# Patient Record
Sex: FEMALE | Race: WHITE | NOT HISPANIC OR LATINO | Employment: UNEMPLOYED | URBAN - METROPOLITAN AREA
[De-identification: names, ages, dates, MRNs, and addresses within clinical notes are randomized per-mention and may not be internally consistent; named-entity substitution may affect disease eponyms.]

---

## 2024-05-02 ENCOUNTER — HOSPITAL ENCOUNTER (INPATIENT)
Facility: HOSPITAL | Age: 64
LOS: 14 days | Discharge: HOME | DRG: 885 | End: 2024-05-16
Attending: STUDENT IN AN ORGANIZED HEALTH CARE EDUCATION/TRAINING PROGRAM | Admitting: PSYCHIATRY & NEUROLOGY

## 2024-05-02 ENCOUNTER — HOSPITAL ENCOUNTER (EMERGENCY)
Facility: HOSPITAL | Age: 64
End: 2024-05-02
Attending: EMERGENCY MEDICINE

## 2024-05-02 VITALS
RESPIRATION RATE: 18 BRPM | DIASTOLIC BLOOD PRESSURE: 74 MMHG | TEMPERATURE: 99.3 F | OXYGEN SATURATION: 98 % | WEIGHT: 135 LBS | BODY MASS INDEX: 21.19 KG/M2 | HEIGHT: 67 IN | HEART RATE: 76 BPM | SYSTOLIC BLOOD PRESSURE: 157 MMHG

## 2024-05-02 DIAGNOSIS — I10 HYPERTENSION: ICD-10-CM

## 2024-05-02 DIAGNOSIS — F43.20 EMOTIONAL CRISIS: ICD-10-CM

## 2024-05-02 DIAGNOSIS — E55.9 VITAMIN D DEFICIENCY: ICD-10-CM

## 2024-05-02 DIAGNOSIS — Z72.0 TOBACCO USE: ICD-10-CM

## 2024-05-02 DIAGNOSIS — F33.2 SEVERE EPISODE OF RECURRENT MAJOR DEPRESSIVE DISORDER, WITHOUT PSYCHOTIC FEATURES (HCC): Primary | ICD-10-CM

## 2024-05-02 DIAGNOSIS — E78.5 DYSLIPIDEMIA: ICD-10-CM

## 2024-05-02 DIAGNOSIS — F10.10 ALCOHOL ABUSE: ICD-10-CM

## 2024-05-02 DIAGNOSIS — F43.20 EMOTIONAL CRISIS: Primary | ICD-10-CM

## 2024-05-02 DIAGNOSIS — F10.90 ALCOHOL USE DISORDER: ICD-10-CM

## 2024-05-02 DIAGNOSIS — F32.9 MAJOR DEPRESSION: ICD-10-CM

## 2024-05-02 LAB
AMPHETAMINES SERPL QL SCN: NEGATIVE
BACTERIA UR QL AUTO: ABNORMAL /HPF
BARBITURATES UR QL: NEGATIVE
BASOPHILS # BLD AUTO: 0.06 THOUSANDS/ÂΜL (ref 0–0.1)
BASOPHILS NFR BLD AUTO: 2 % (ref 0–1)
BENZODIAZ UR QL: NEGATIVE
BILIRUB UR QL STRIP: NEGATIVE
CLARITY UR: CLEAR
COCAINE UR QL: NEGATIVE
COLOR UR: YELLOW
EOSINOPHIL # BLD AUTO: 0.18 THOUSAND/ÂΜL (ref 0–0.61)
EOSINOPHIL NFR BLD AUTO: 5 % (ref 0–6)
ERYTHROCYTE [DISTWIDTH] IN BLOOD BY AUTOMATED COUNT: 12.9 % (ref 11.6–15.1)
ETHANOL EXG-MCNC: 0 MG/DL
ETHANOL SERPL-MCNC: 309 MG/DL
FENTANYL UR QL SCN: NEGATIVE
GLUCOSE UR STRIP-MCNC: NEGATIVE MG/DL
HCT VFR BLD AUTO: 41 % (ref 34.8–46.1)
HGB BLD-MCNC: 13.6 G/DL (ref 11.5–15.4)
HGB UR QL STRIP.AUTO: ABNORMAL
HYDROCODONE UR QL SCN: NEGATIVE
IMM GRANULOCYTES # BLD AUTO: 0.02 THOUSAND/UL (ref 0–0.2)
IMM GRANULOCYTES NFR BLD AUTO: 1 % (ref 0–2)
KETONES UR STRIP-MCNC: ABNORMAL MG/DL
LEUKOCYTE ESTERASE UR QL STRIP: NEGATIVE
LYMPHOCYTES # BLD AUTO: 1.46 THOUSANDS/ÂΜL (ref 0.6–4.47)
LYMPHOCYTES NFR BLD AUTO: 36 % (ref 14–44)
MCH RBC QN AUTO: 33.2 PG (ref 26.8–34.3)
MCHC RBC AUTO-ENTMCNC: 33.2 G/DL (ref 31.4–37.4)
MCV RBC AUTO: 100 FL (ref 82–98)
METHADONE UR QL: NEGATIVE
MONOCYTES # BLD AUTO: 0.25 THOUSAND/ÂΜL (ref 0.17–1.22)
MONOCYTES NFR BLD AUTO: 6 % (ref 4–12)
MUCOUS THREADS UR QL AUTO: ABNORMAL
NEUTROPHILS # BLD AUTO: 2.07 THOUSANDS/ÂΜL (ref 1.85–7.62)
NEUTS SEG NFR BLD AUTO: 50 % (ref 43–75)
NITRITE UR QL STRIP: NEGATIVE
NON-SQ EPI CELLS URNS QL MICRO: ABNORMAL /HPF
NRBC BLD AUTO-RTO: 0 /100 WBCS
OPIATES UR QL SCN: NEGATIVE
OXYCODONE+OXYMORPHONE UR QL SCN: NEGATIVE
PCP UR QL: NEGATIVE
PH UR STRIP.AUTO: 5 [PH]
PLATELET # BLD AUTO: 261 THOUSANDS/UL (ref 149–390)
PMV BLD AUTO: 9.2 FL (ref 8.9–12.7)
PROT UR STRIP-MCNC: NEGATIVE MG/DL
RBC # BLD AUTO: 4.1 MILLION/UL (ref 3.81–5.12)
RBC #/AREA URNS AUTO: ABNORMAL /HPF
SP GR UR STRIP.AUTO: 1.02 (ref 1–1.03)
THC UR QL: NEGATIVE
TSH SERPL DL<=0.05 MIU/L-ACNC: 1.7 UIU/ML (ref 0.45–4.5)
UROBILINOGEN UR STRIP-ACNC: <2 MG/DL
WBC # BLD AUTO: 4.04 THOUSAND/UL (ref 4.31–10.16)
WBC #/AREA URNS AUTO: ABNORMAL /HPF

## 2024-05-02 PROCEDURE — 84443 ASSAY THYROID STIM HORMONE: CPT | Performed by: EMERGENCY MEDICINE

## 2024-05-02 PROCEDURE — 80053 COMPREHEN METABOLIC PANEL: CPT | Performed by: EMERGENCY MEDICINE

## 2024-05-02 PROCEDURE — 85025 COMPLETE CBC W/AUTO DIFF WBC: CPT | Performed by: EMERGENCY MEDICINE

## 2024-05-02 PROCEDURE — 36415 COLL VENOUS BLD VENIPUNCTURE: CPT | Performed by: EMERGENCY MEDICINE

## 2024-05-02 PROCEDURE — 83735 ASSAY OF MAGNESIUM: CPT | Performed by: STUDENT IN AN ORGANIZED HEALTH CARE EDUCATION/TRAINING PROGRAM

## 2024-05-02 PROCEDURE — 99285 EMERGENCY DEPT VISIT HI MDM: CPT

## 2024-05-02 PROCEDURE — 82077 ASSAY SPEC XCP UR&BREATH IA: CPT | Performed by: EMERGENCY MEDICINE

## 2024-05-02 PROCEDURE — 99285 EMERGENCY DEPT VISIT HI MDM: CPT | Performed by: EMERGENCY MEDICINE

## 2024-05-02 PROCEDURE — 81001 URINALYSIS AUTO W/SCOPE: CPT | Performed by: EMERGENCY MEDICINE

## 2024-05-02 PROCEDURE — 80307 DRUG TEST PRSMV CHEM ANLYZR: CPT | Performed by: EMERGENCY MEDICINE

## 2024-05-02 PROCEDURE — 82075 ASSAY OF BREATH ETHANOL: CPT | Performed by: EMERGENCY MEDICINE

## 2024-05-02 RX ORDER — ACETAMINOPHEN 325 MG/1
650 TABLET ORAL EVERY 4 HOURS PRN
Status: CANCELLED | OUTPATIENT
Start: 2024-05-02

## 2024-05-02 RX ORDER — TRAZODONE HYDROCHLORIDE 50 MG/1
50 TABLET ORAL
Status: ON HOLD | COMMUNITY

## 2024-05-02 RX ORDER — MAGNESIUM HYDROXIDE/ALUMINUM HYDROXICE/SIMETHICONE 120; 1200; 1200 MG/30ML; MG/30ML; MG/30ML
30 SUSPENSION ORAL EVERY 4 HOURS PRN
Status: DISCONTINUED | OUTPATIENT
Start: 2024-05-02 | End: 2024-05-16 | Stop reason: HOSPADM

## 2024-05-02 RX ORDER — TRAZODONE HYDROCHLORIDE 50 MG/1
50 TABLET ORAL
Status: DISCONTINUED | OUTPATIENT
Start: 2024-05-02 | End: 2024-05-06

## 2024-05-02 RX ORDER — BENZTROPINE MESYLATE 1 MG/ML
1 INJECTION INTRAMUSCULAR; INTRAVENOUS
Status: DISCONTINUED | OUTPATIENT
Start: 2024-05-02 | End: 2024-05-16 | Stop reason: HOSPADM

## 2024-05-02 RX ORDER — ACETAMINOPHEN 325 MG/1
975 TABLET ORAL EVERY 6 HOURS PRN
Status: DISCONTINUED | OUTPATIENT
Start: 2024-05-02 | End: 2024-05-16 | Stop reason: HOSPADM

## 2024-05-02 RX ORDER — MAGNESIUM HYDROXIDE/ALUMINUM HYDROXICE/SIMETHICONE 120; 1200; 1200 MG/30ML; MG/30ML; MG/30ML
30 SUSPENSION ORAL EVERY 4 HOURS PRN
Status: CANCELLED | OUTPATIENT
Start: 2024-05-02

## 2024-05-02 RX ORDER — CITALOPRAM 20 MG/1
20 TABLET ORAL DAILY
Status: ON HOLD | COMMUNITY

## 2024-05-02 RX ORDER — LORAZEPAM 2 MG/ML
2 INJECTION INTRAMUSCULAR
Status: DISCONTINUED | OUTPATIENT
Start: 2024-05-02 | End: 2024-05-16 | Stop reason: HOSPADM

## 2024-05-02 RX ORDER — HYDROXYZINE 50 MG/1
50 TABLET, FILM COATED ORAL
Status: DISCONTINUED | OUTPATIENT
Start: 2024-05-02 | End: 2024-05-16 | Stop reason: HOSPADM

## 2024-05-02 RX ORDER — BISACODYL 10 MG
10 SUPPOSITORY, RECTAL RECTAL DAILY PRN
Status: DISCONTINUED | OUTPATIENT
Start: 2024-05-02 | End: 2024-05-16 | Stop reason: HOSPADM

## 2024-05-02 RX ORDER — LORAZEPAM 2 MG/ML
1 INJECTION INTRAMUSCULAR
Status: CANCELLED | OUTPATIENT
Start: 2024-05-02

## 2024-05-02 RX ORDER — ACETAMINOPHEN 325 MG/1
650 TABLET ORAL EVERY 4 HOURS PRN
Status: DISCONTINUED | OUTPATIENT
Start: 2024-05-02 | End: 2024-05-16 | Stop reason: HOSPADM

## 2024-05-02 RX ORDER — DIPHENHYDRAMINE HYDROCHLORIDE 50 MG/ML
50 INJECTION INTRAMUSCULAR; INTRAVENOUS EVERY 6 HOURS PRN
Status: CANCELLED | OUTPATIENT
Start: 2024-05-02

## 2024-05-02 RX ORDER — HYDROXYZINE HYDROCHLORIDE 25 MG/1
25 TABLET, FILM COATED ORAL
Status: CANCELLED | OUTPATIENT
Start: 2024-05-02

## 2024-05-02 RX ORDER — BENZTROPINE MESYLATE 1 MG/ML
1 INJECTION INTRAMUSCULAR; INTRAVENOUS
Status: CANCELLED | OUTPATIENT
Start: 2024-05-02

## 2024-05-02 RX ORDER — TRAZODONE HYDROCHLORIDE 50 MG/1
50 TABLET ORAL
Status: CANCELLED | OUTPATIENT
Start: 2024-05-02

## 2024-05-02 RX ORDER — HALOPERIDOL 5 MG/1
5 TABLET ORAL
Status: DISCONTINUED | OUTPATIENT
Start: 2024-05-02 | End: 2024-05-16 | Stop reason: HOSPADM

## 2024-05-02 RX ORDER — HALOPERIDOL 5 MG/1
2.5 TABLET ORAL
Status: DISCONTINUED | OUTPATIENT
Start: 2024-05-02 | End: 2024-05-16 | Stop reason: HOSPADM

## 2024-05-02 RX ORDER — HALOPERIDOL 1 MG/1
1 TABLET ORAL EVERY 6 HOURS PRN
Status: CANCELLED | OUTPATIENT
Start: 2024-05-02

## 2024-05-02 RX ORDER — POLYETHYLENE GLYCOL 3350 17 G/17G
17 POWDER, FOR SOLUTION ORAL DAILY PRN
Status: DISCONTINUED | OUTPATIENT
Start: 2024-05-02 | End: 2024-05-16 | Stop reason: HOSPADM

## 2024-05-02 RX ORDER — LANOLIN ALCOHOL/MO/W.PET/CERES
3 CREAM (GRAM) TOPICAL
Status: CANCELLED | OUTPATIENT
Start: 2024-05-02

## 2024-05-02 RX ORDER — HYDROXYZINE 50 MG/1
100 TABLET, FILM COATED ORAL
Status: DISCONTINUED | OUTPATIENT
Start: 2024-05-02 | End: 2024-05-16 | Stop reason: HOSPADM

## 2024-05-02 RX ORDER — BENZTROPINE MESYLATE 1 MG/1
1 TABLET ORAL
Status: DISCONTINUED | OUTPATIENT
Start: 2024-05-02 | End: 2024-05-16 | Stop reason: HOSPADM

## 2024-05-02 RX ORDER — HALOPERIDOL 5 MG/ML
5 INJECTION INTRAMUSCULAR
Status: DISCONTINUED | OUTPATIENT
Start: 2024-05-02 | End: 2024-05-16 | Stop reason: HOSPADM

## 2024-05-02 RX ORDER — LORAZEPAM 2 MG/ML
2 INJECTION INTRAMUSCULAR EVERY 6 HOURS PRN
Status: DISCONTINUED | OUTPATIENT
Start: 2024-05-02 | End: 2024-05-16 | Stop reason: HOSPADM

## 2024-05-02 RX ORDER — HALOPERIDOL 5 MG/1
2.5 TABLET ORAL
Status: CANCELLED | OUTPATIENT
Start: 2024-05-02

## 2024-05-02 RX ORDER — AMOXICILLIN 250 MG
1 CAPSULE ORAL DAILY PRN
Status: DISCONTINUED | OUTPATIENT
Start: 2024-05-02 | End: 2024-05-16 | Stop reason: HOSPADM

## 2024-05-02 RX ORDER — ACETAMINOPHEN 325 MG/1
650 TABLET ORAL EVERY 6 HOURS PRN
Status: CANCELLED | OUTPATIENT
Start: 2024-05-02

## 2024-05-02 RX ORDER — HALOPERIDOL 5 MG/1
5 TABLET ORAL
Status: CANCELLED | OUTPATIENT
Start: 2024-05-02

## 2024-05-02 RX ORDER — LORAZEPAM 2 MG/ML
2 INJECTION INTRAMUSCULAR EVERY 6 HOURS PRN
Status: CANCELLED | OUTPATIENT
Start: 2024-05-02

## 2024-05-02 RX ORDER — HALOPERIDOL 5 MG/ML
5 INJECTION INTRAMUSCULAR
Status: CANCELLED | OUTPATIENT
Start: 2024-05-02

## 2024-05-02 RX ORDER — ACETAMINOPHEN 325 MG/1
975 TABLET ORAL EVERY 6 HOURS PRN
Status: CANCELLED | OUTPATIENT
Start: 2024-05-02

## 2024-05-02 RX ORDER — DIPHENHYDRAMINE HYDROCHLORIDE 50 MG/ML
50 INJECTION INTRAMUSCULAR; INTRAVENOUS EVERY 6 HOURS PRN
Status: DISCONTINUED | OUTPATIENT
Start: 2024-05-02 | End: 2024-05-16 | Stop reason: HOSPADM

## 2024-05-02 RX ORDER — LANOLIN ALCOHOL/MO/W.PET/CERES
3 CREAM (GRAM) TOPICAL
Status: DISCONTINUED | OUTPATIENT
Start: 2024-05-02 | End: 2024-05-10

## 2024-05-02 RX ORDER — HYDROXYZINE HYDROCHLORIDE 25 MG/1
25 TABLET, FILM COATED ORAL
Status: DISCONTINUED | OUTPATIENT
Start: 2024-05-02 | End: 2024-05-16 | Stop reason: HOSPADM

## 2024-05-02 RX ORDER — BENZTROPINE MESYLATE 0.5 MG/1
1 TABLET ORAL
Status: CANCELLED | OUTPATIENT
Start: 2024-05-02

## 2024-05-02 RX ORDER — POLYETHYLENE GLYCOL 3350 17 G/17G
17 POWDER, FOR SOLUTION ORAL DAILY PRN
Status: CANCELLED | OUTPATIENT
Start: 2024-05-02

## 2024-05-02 RX ORDER — HYDROXYZINE HYDROCHLORIDE 25 MG/1
50 TABLET, FILM COATED ORAL
Status: CANCELLED | OUTPATIENT
Start: 2024-05-02

## 2024-05-02 RX ORDER — ACETAMINOPHEN 325 MG/1
650 TABLET ORAL EVERY 6 HOURS PRN
Status: DISCONTINUED | OUTPATIENT
Start: 2024-05-02 | End: 2024-05-16 | Stop reason: HOSPADM

## 2024-05-02 RX ORDER — HALOPERIDOL 5 MG/ML
2.5 INJECTION INTRAMUSCULAR
Status: CANCELLED | OUTPATIENT
Start: 2024-05-02

## 2024-05-02 RX ORDER — BENZTROPINE MESYLATE 1 MG/ML
0.5 INJECTION INTRAMUSCULAR; INTRAVENOUS
Status: DISCONTINUED | OUTPATIENT
Start: 2024-05-02 | End: 2024-05-16 | Stop reason: HOSPADM

## 2024-05-02 RX ORDER — HYDROXYZINE HYDROCHLORIDE 25 MG/1
100 TABLET, FILM COATED ORAL
Status: CANCELLED | OUTPATIENT
Start: 2024-05-02

## 2024-05-02 RX ORDER — AMOXICILLIN 250 MG
1 CAPSULE ORAL DAILY PRN
Status: CANCELLED | OUTPATIENT
Start: 2024-05-02

## 2024-05-02 RX ORDER — BISACODYL 10 MG
10 SUPPOSITORY, RECTAL RECTAL DAILY PRN
Status: CANCELLED | OUTPATIENT
Start: 2024-05-02

## 2024-05-02 RX ORDER — ESCITALOPRAM OXALATE 20 MG/1
20 TABLET ORAL DAILY
COMMUNITY
End: 2024-05-16

## 2024-05-02 RX ORDER — BENZTROPINE MESYLATE 1 MG/ML
0.5 INJECTION INTRAMUSCULAR; INTRAVENOUS
Status: CANCELLED | OUTPATIENT
Start: 2024-05-02

## 2024-05-02 RX ORDER — LORAZEPAM 2 MG/ML
1 INJECTION INTRAMUSCULAR
Status: DISCONTINUED | OUTPATIENT
Start: 2024-05-02 | End: 2024-05-16 | Stop reason: HOSPADM

## 2024-05-02 RX ORDER — BISACODYL 10 MG
10 SUPPOSITORY, RECTAL RECTAL DAILY PRN
Status: DISCONTINUED | OUTPATIENT
Start: 2024-05-02 | End: 2024-05-02

## 2024-05-02 RX ORDER — HALOPERIDOL 1 MG/1
1 TABLET ORAL EVERY 6 HOURS PRN
Status: DISCONTINUED | OUTPATIENT
Start: 2024-05-02 | End: 2024-05-16 | Stop reason: HOSPADM

## 2024-05-02 RX ORDER — HALOPERIDOL 5 MG/ML
2.5 INJECTION INTRAMUSCULAR
Status: DISCONTINUED | OUTPATIENT
Start: 2024-05-02 | End: 2024-05-16 | Stop reason: HOSPADM

## 2024-05-02 RX ORDER — LORAZEPAM 2 MG/ML
2 INJECTION INTRAMUSCULAR
Status: CANCELLED | OUTPATIENT
Start: 2024-05-02

## 2024-05-02 RX ADMIN — NICOTINE POLACRILEX 2 MG: 2 GUM, CHEWING BUCCAL at 18:01

## 2024-05-02 RX ADMIN — MELATONIN TAB 3 MG 3 MG: 3 TAB at 22:21

## 2024-05-02 RX ADMIN — TRAZODONE HYDROCHLORIDE 50 MG: 50 TABLET ORAL at 22:21

## 2024-05-02 NOTE — ED NOTES
5/2/24 @ 0930:  ROYCE notes that patient's BAL was 309 @ 0830; medical clearance should be around 1630, at which point, patient will be assessed.  Wendy MS

## 2024-05-02 NOTE — ED NOTES
"Please see separate note by Anjel Harp.    64 yo IRLANDA presented to ER this morning via police with a BAL of 309 @ 08:30 due to: \"Because I threatened to commit suicide to my sister - plan was to leave the house - drink myself to oblivion and to not wake up or then take pills\" - also wrote a suicide note.  The patient is not known to PES.  There were no identified stressors by the patient's report.  Mood = initially stated \"been OK\" - later stated - \"I have depression\".  Symptoms include: lethality concerns and probably minimizing her Etoh use; poor sleep of 5-6 hours; poor self esteem;.  The patient denies: HI; psychosis; paranoia; drug use; any change with concentration/energy level.  Reports a past hx of \"anxiety and panic d/o\" - but not having symptoms now.    PES briefly spoke to patient's sister, Darnell @ [phone] and then had sister speak with patient.  "

## 2024-05-02 NOTE — ED NOTES
Pt given lunch tray and reminded we require urine sample from her     Derek Johanson, RONEL  05/02/24 6918

## 2024-05-02 NOTE — ED PROVIDER NOTES
History  Chief Complaint   Patient presents with    Psychiatric Evaluation     Pt per police admits being suicidal, her plan is to take pills and drink alcohol to kill herself     Patient brought in by police for evaluation of suicidal ideations.  Family called police after the found out was her intention to take pills and go outside and die.  Patient has a history of previous attempts.  Patient states she was stopped before she was able to attempt this time.  Reports ongoing depression no recent inciting events.  Admits to drinking alcohol today but denies any other drug use.  She was on SSRI but has not had it for the last 3 days.      History provided by:  Police and patient   used: No    Psychiatric Evaluation  Presenting symptoms: suicidal thoughts        None       No past medical history on file.    No past surgical history on file.    No family history on file.  I have reviewed and agree with the history as documented.    No existing history information found.  No existing history information found.       Review of Systems   Psychiatric/Behavioral:  Positive for suicidal ideas.    All other systems reviewed and are negative.      Physical Exam  Physical Exam  Vitals and nursing note reviewed.   Constitutional:       General: She is not in acute distress.  Cardiovascular:      Rate and Rhythm: Normal rate and regular rhythm.   Pulmonary:      Effort: Pulmonary effort is normal. No respiratory distress.      Breath sounds: Normal breath sounds.   Neurological:      General: No focal deficit present.      Mental Status: She is alert and oriented to person, place, and time.         Vital Signs  ED Triage Vitals [05/02/24 0808]   Temperature Pulse Respirations Blood Pressure SpO2   98.5 °F (36.9 °C) 85 18 168/92 98 %      Temp src Heart Rate Source Patient Position - Orthostatic VS BP Location FiO2 (%)   -- Monitor -- Right arm --      Pain Score       --           Vitals:    05/02/24 0808    BP: 168/92   Pulse: 85         Visual Acuity      ED Medications  Medications - No data to display    Diagnostic Studies  Results Reviewed       Procedure Component Value Units Date/Time    CBC and differential [[id number]]  (Abnormal) Collected: 05/02/24 0830    Lab Status: Final result Specimen: Blood from Hand, Right Updated: 05/02/24 0836     WBC 4.04 Thousand/uL      RBC 4.10 Million/uL      Hemoglobin 13.6 g/dL      Hematocrit 41.0 %       fL      MCH 33.2 pg      MCHC 33.2 g/dL      RDW 12.9 %      MPV 9.2 fL      Platelets 261 Thousands/uL      nRBC 0 /100 WBCs      Segmented % 50 %      Immature Grans % 1 %      Lymphocytes % 36 %      Monocytes % 6 %      Eosinophils Relative 5 %      Basophils Relative 2 %      Absolute Neutrophils 2.07 Thousands/µL      Absolute Immature Grans 0.02 Thousand/uL      Absolute Lymphocytes 1.46 Thousands/µL      Absolute Monocytes 0.25 Thousand/µL      Eosinophils Absolute 0.18 Thousand/µL      Basophils Absolute 0.06 Thousands/µL     Ethanol [[id number]] Collected: 05/02/24 0830    Lab Status: In process Specimen: Blood from Arm, Right Updated: 05/02/24 0833    Comprehensive metabolic panel [[id number]] Collected: 05/02/24 0830    Lab Status: In process Specimen: Blood from Hand, Right Updated: 05/02/24 0833    UA (URINE) with reflex to Scope [[id number]]     Lab Status: No result Specimen: Urine     Rapid drug screen, urine [[id number]]     Lab Status: No result Specimen: Urine                    No orders to display              Procedures  Procedures         ED Course                               SBIRT 20yo+      Flowsheet Row Most Recent Value   Initial Alcohol Screen: US AUDIT-C     1. How often do you have a drink containing alcohol? 6 Filed at: 05/02/2024 0839   2. How many drinks containing alcohol do you have on a typical day you are drinking?  6 Filed at: 05/02/2024 0839   3b. FEMALE Any Age, or MALE 65+: How often do you have 4 or more drinks on one  occassion? 6 Filed at: 05/02/2024 0839   Audit-C Score 18 Filed at: 05/02/2024 0839   Full Alcohol Screen: US AUDIT    4. How often during the last year have you found that you were not able to stop drinking once you had started? 4 Filed at: 05/02/2024 0839   5. How often during past year have you failed to do what was normally expected of you because of drinking?  4 Filed at: 05/02/2024 0839   6. How often in past year have you needed a first drink in the morning to get yourself going after a heavy drinking session?  4 Filed at: 05/02/2024 0839   7. How often in past year have you had feeling of guilt or remorse after drinking?  4 Filed at: 05/02/2024 0839   8. How often in past year have you been unable to remember what happened night before because you had been drinking?  3 Filed at: 05/02/2024 0839   9. Have you or someone else been injured as a result of your drinking?  0 Filed at: 05/02/2024 0839   10. Has a relative, friend, doctor or other health worker been concerned about your drinking and suggested you cut down?  4 Filed at: 05/02/2024 0839   AUDIT Total Score 41 Filed at: 05/02/2024 0839   SUZANNE: How many times in the past year have you...    Used an illegal drug or used a prescription medication for non-medical reasons? Never Filed at: 05/02/2024 0839                      Medical Decision Making  Pulse ox 98% on room air indicating adequate oxygenation.    Patient medically cleared for mental health evaluation patient treatment as needed.      Signed out to next provider Dr. Sanders pending crisis evaluation.    Amount and/or Complexity of Data Reviewed  Labs: ordered.    Risk  Decision regarding hospitalization.             Disposition  Final diagnoses:   None     ED Disposition       ED Disposition   Transfer to Behavioral Health Condition   --    Date/Time   Thu May 2, 2024 0821    Comment   Christy Borjas should be transferred out to [facility] and has been medically cleared.               Follow-up  Information    None         Patient's Medications    No medications on file       No discharge procedures on file.    PDMP Review       None            ED Provider  Electronically Signed by             Arnav House DO  05/03/24 9904

## 2024-05-02 NOTE — ED NOTES
Received pt from previous shift. Pt quietly resting on bed watching tv at this time. Respirations even and non-labored; no distress noted at this time. Pt offers no c/o at this time. 1:1 continual observation remains at this time.     Amarilis Almazan RN  05/02/24 1912

## 2024-05-02 NOTE — ED NOTES
"5/2/24 @ 1230:  Patient's sister, who is also patient's emergency contact came to visit with patient.  PES informed that she wasn't able to have visitors at this point.  Sister provided a suicide note from the patient and reporrts that patient has at least 3 other attempts which resulted in hospitalizations.  Sister says that patient has a long history of alcoholism and moved in with her sister in Oct/Nov of 2023, and was living in [place] prior.  In addition, sister reports a very strong family history of substance abuse and mental illness.  Sister provided a prescription bottle of Trazodone from 2023 which was given to RN to sent to Pharmacy.  PES informed sister that patient wouldn't be \"medically cleared\" until 1630.  Please outlined the process and if patient isn't voluntary, the sister would like to talk to her.  PES states that would be up to the patient, and sister understands.  PES will continue to monitor.  MS Wendy  "

## 2024-05-02 NOTE — ED NOTES
17:30 - spoke with [facility] - beds available - patient signed 201 - faxed to [facility] with suicide note.    18:30 - [facility] requested TSH and repeat Etoh - ER staff advised.    19:20 - [facility] texted all labs now resulted.    Patient is accepted at  6B.  Patient is accepted by Dr.Qureshi kenny Siddiqui    Transportation is arranged with [facility]. @ 20:35 -     Transportation is scheduled for 21:30 via [facility] - arrived 21:15  Patient may go to the floor after 21:30    Nurse report is to be called to [phone] prior to patient transfer.    Patient has no insurance coverage.       Patient's sister Darnell informed of transport time and phone # to unit.

## 2024-05-03 PROBLEM — E78.5 DYSLIPIDEMIA: Status: ACTIVE | Noted: 2024-05-03

## 2024-05-03 PROBLEM — F33.2 SEVERE EPISODE OF RECURRENT MAJOR DEPRESSIVE DISORDER, WITHOUT PSYCHOTIC FEATURES (HCC): Status: ACTIVE | Noted: 2024-05-03

## 2024-05-03 PROBLEM — Z72.0 TOBACCO USE: Status: ACTIVE | Noted: 2024-05-03

## 2024-05-03 PROBLEM — I10 HYPERTENSION: Status: ACTIVE | Noted: 2024-05-03

## 2024-05-03 PROBLEM — F10.90 ALCOHOL USE DISORDER: Status: ACTIVE | Noted: 2024-05-03

## 2024-05-03 PROBLEM — Z00.8 MEDICAL CLEARANCE FOR PSYCHIATRIC ADMISSION: Status: ACTIVE | Noted: 2024-05-03

## 2024-05-03 LAB
25(OH)D3 SERPL-MCNC: 9.3 NG/ML (ref 30–100)
ALBUMIN SERPL BCP-MCNC: 3.8 G/DL (ref 3.5–5)
ALP SERPL-CCNC: 46 U/L (ref 34–104)
ALT SERPL W P-5'-P-CCNC: 8 U/L (ref 7–52)
ANION GAP SERPL CALCULATED.3IONS-SCNC: 12 MMOL/L (ref 4–13)
APTT PPP: 30 SECONDS (ref 23–37)
AST SERPL W P-5'-P-CCNC: 20 U/L (ref 13–39)
ATRIAL RATE: 59 BPM
BACTERIA UR QL AUTO: ABNORMAL /HPF
BASOPHILS # BLD AUTO: 0.07 THOUSANDS/ÂΜL (ref 0–0.1)
BASOPHILS NFR BLD AUTO: 1 % (ref 0–1)
BILIRUB SERPL-MCNC: 0.71 MG/DL (ref 0.2–1)
BILIRUB UR QL STRIP: NEGATIVE
BUN SERPL-MCNC: 15 MG/DL (ref 5–25)
CALCIUM SERPL-MCNC: 9 MG/DL (ref 8.4–10.2)
CHLORIDE SERPL-SCNC: 99 MMOL/L (ref 96–108)
CHOLEST SERPL-MCNC: 247 MG/DL
CLARITY UR: CLEAR
CO2 SERPL-SCNC: 25 MMOL/L (ref 21–32)
COLOR UR: ABNORMAL
CREAT SERPL-MCNC: 0.41 MG/DL (ref 0.6–1.3)
EOSINOPHIL # BLD AUTO: 0.26 THOUSAND/ÂΜL (ref 0–0.61)
EOSINOPHIL NFR BLD AUTO: 5 % (ref 0–6)
ERYTHROCYTE [DISTWIDTH] IN BLOOD BY AUTOMATED COUNT: 12.5 % (ref 11.6–15.1)
FIBRINOGEN PPP-MCNC: 404 MG/DL (ref 207–520)
FOLATE SERPL-MCNC: 5.9 NG/ML
GFR SERPL CREATININE-BSD FRML MDRD: 111 ML/MIN/1.73SQ M
GLUCOSE P FAST SERPL-MCNC: 75 MG/DL (ref 65–99)
GLUCOSE SERPL-MCNC: 75 MG/DL (ref 65–140)
GLUCOSE UR STRIP-MCNC: NEGATIVE MG/DL
HCT VFR BLD AUTO: 40.6 % (ref 34.8–46.1)
HDLC SERPL-MCNC: 72 MG/DL
HGB BLD-MCNC: 12.6 G/DL (ref 11.5–15.4)
HGB UR QL STRIP.AUTO: NEGATIVE
IMM GRANULOCYTES # BLD AUTO: 0 THOUSAND/UL (ref 0–0.2)
IMM GRANULOCYTES NFR BLD AUTO: 0 % (ref 0–2)
INR PPP: 0.91 (ref 0.84–1.19)
KETONES UR STRIP-MCNC: ABNORMAL MG/DL
LDLC SERPL CALC-MCNC: 149 MG/DL (ref 0–100)
LEUKOCYTE ESTERASE UR QL STRIP: 25
LYMPHOCYTES # BLD AUTO: 1.51 THOUSANDS/ÂΜL (ref 0.6–4.47)
LYMPHOCYTES NFR BLD AUTO: 31 % (ref 14–44)
MAGNESIUM SERPL-MCNC: 2.1 MG/DL (ref 1.9–2.7)
MCH RBC QN AUTO: 32.6 PG (ref 26.8–34.3)
MCHC RBC AUTO-ENTMCNC: 31 G/DL (ref 31.4–37.4)
MCV RBC AUTO: 105 FL (ref 82–98)
MONOCYTES # BLD AUTO: 0.64 THOUSAND/ÂΜL (ref 0.17–1.22)
MONOCYTES NFR BLD AUTO: 13 % (ref 4–12)
MUCOUS THREADS UR QL AUTO: ABNORMAL
NEUTROPHILS # BLD AUTO: 2.45 THOUSANDS/ÂΜL (ref 1.85–7.62)
NEUTS SEG NFR BLD AUTO: 50 % (ref 43–75)
NITRITE UR QL STRIP: NEGATIVE
NON-SQ EPI CELLS URNS QL MICRO: ABNORMAL /HPF
NONHDLC SERPL-MCNC: 175 MG/DL
NRBC BLD AUTO-RTO: 0 /100 WBCS
P AXIS: 33 DEGREES
PH UR STRIP.AUTO: 6.5 [PH]
PLATELET # BLD AUTO: 218 THOUSANDS/UL (ref 149–390)
PLATELET # BLD AUTO: 218 THOUSANDS/UL (ref 149–390)
PMV BLD AUTO: 9.7 FL (ref 8.9–12.7)
PMV BLD AUTO: 9.8 FL (ref 8.9–12.7)
POTASSIUM SERPL-SCNC: 3.7 MMOL/L (ref 3.5–5.3)
PR INTERVAL: 144 MS
PROT SERPL-MCNC: 6.6 G/DL (ref 6.4–8.4)
PROT UR STRIP-MCNC: ABNORMAL MG/DL
PROTHROMBIN TIME: 12.6 SECONDS (ref 11.6–14.5)
QRS AXIS: -3 DEGREES
QRSD INTERVAL: 90 MS
QT INTERVAL: 480 MS
QTC INTERVAL: 475 MS
RBC # BLD AUTO: 3.86 MILLION/UL (ref 3.81–5.12)
RBC #/AREA URNS AUTO: ABNORMAL /HPF
SODIUM SERPL-SCNC: 136 MMOL/L (ref 135–147)
SP GR UR STRIP.AUTO: 1.01 (ref 1–1.04)
T WAVE AXIS: 38 DEGREES
THROMBIN TIME: 16.8 SECONDS (ref 14.7–18.4)
TRIGL SERPL-MCNC: 132 MG/DL
TSH SERPL DL<=0.05 MIU/L-ACNC: 2.58 UIU/ML (ref 0.45–4.5)
UROBILINOGEN UA: NEGATIVE MG/DL
VENTRICULAR RATE: 59 BPM
VIT B12 SERPL-MCNC: 147 PG/ML (ref 180–914)
WBC # BLD AUTO: 4.93 THOUSAND/UL (ref 4.31–10.16)
WBC #/AREA URNS AUTO: ABNORMAL /HPF

## 2024-05-03 PROCEDURE — 85049 AUTOMATED PLATELET COUNT: CPT | Performed by: STUDENT IN AN ORGANIZED HEALTH CARE EDUCATION/TRAINING PROGRAM

## 2024-05-03 PROCEDURE — 93010 ELECTROCARDIOGRAM REPORT: CPT | Performed by: STUDENT IN AN ORGANIZED HEALTH CARE EDUCATION/TRAINING PROGRAM

## 2024-05-03 PROCEDURE — 82746 ASSAY OF FOLIC ACID SERUM: CPT | Performed by: PSYCHIATRY & NEUROLOGY

## 2024-05-03 PROCEDURE — 80061 LIPID PANEL: CPT | Performed by: PSYCHIATRY & NEUROLOGY

## 2024-05-03 PROCEDURE — 85025 COMPLETE CBC W/AUTO DIFF WBC: CPT | Performed by: PSYCHIATRY & NEUROLOGY

## 2024-05-03 PROCEDURE — 80053 COMPREHEN METABOLIC PANEL: CPT | Performed by: PSYCHIATRY & NEUROLOGY

## 2024-05-03 PROCEDURE — 85384 FIBRINOGEN ACTIVITY: CPT | Performed by: STUDENT IN AN ORGANIZED HEALTH CARE EDUCATION/TRAINING PROGRAM

## 2024-05-03 PROCEDURE — 85730 THROMBOPLASTIN TIME PARTIAL: CPT | Performed by: STUDENT IN AN ORGANIZED HEALTH CARE EDUCATION/TRAINING PROGRAM

## 2024-05-03 PROCEDURE — 85670 THROMBIN TIME PLASMA: CPT | Performed by: STUDENT IN AN ORGANIZED HEALTH CARE EDUCATION/TRAINING PROGRAM

## 2024-05-03 PROCEDURE — 82306 VITAMIN D 25 HYDROXY: CPT | Performed by: PSYCHIATRY & NEUROLOGY

## 2024-05-03 PROCEDURE — 99223 1ST HOSP IP/OBS HIGH 75: CPT | Performed by: STUDENT IN AN ORGANIZED HEALTH CARE EDUCATION/TRAINING PROGRAM

## 2024-05-03 PROCEDURE — 84443 ASSAY THYROID STIM HORMONE: CPT | Performed by: PSYCHIATRY & NEUROLOGY

## 2024-05-03 PROCEDURE — 85610 PROTHROMBIN TIME: CPT | Performed by: STUDENT IN AN ORGANIZED HEALTH CARE EDUCATION/TRAINING PROGRAM

## 2024-05-03 PROCEDURE — 81001 URINALYSIS AUTO W/SCOPE: CPT | Performed by: PSYCHIATRY & NEUROLOGY

## 2024-05-03 PROCEDURE — 93005 ELECTROCARDIOGRAM TRACING: CPT

## 2024-05-03 PROCEDURE — 99253 IP/OBS CNSLTJ NEW/EST LOW 45: CPT | Performed by: NURSE PRACTITIONER

## 2024-05-03 PROCEDURE — 82607 VITAMIN B-12: CPT | Performed by: PSYCHIATRY & NEUROLOGY

## 2024-05-03 RX ORDER — ESCITALOPRAM OXALATE 10 MG/1
20 TABLET ORAL DAILY
Status: DISCONTINUED | OUTPATIENT
Start: 2024-05-03 | End: 2024-05-16 | Stop reason: HOSPADM

## 2024-05-03 RX ORDER — LORAZEPAM 1 MG/1
1 TABLET ORAL EVERY 8 HOURS PRN
Status: DISCONTINUED | OUTPATIENT
Start: 2024-05-03 | End: 2024-05-06

## 2024-05-03 RX ORDER — ERGOCALCIFEROL 1.25 MG/1
50000 CAPSULE ORAL WEEKLY
Status: DISCONTINUED | OUTPATIENT
Start: 2024-05-03 | End: 2024-05-16 | Stop reason: HOSPADM

## 2024-05-03 RX ORDER — ATORVASTATIN CALCIUM 10 MG/1
10 TABLET, FILM COATED ORAL
Status: DISCONTINUED | OUTPATIENT
Start: 2024-05-03 | End: 2024-05-16 | Stop reason: HOSPADM

## 2024-05-03 RX ORDER — AMLODIPINE BESYLATE 5 MG/1
5 TABLET ORAL DAILY
Status: DISCONTINUED | OUTPATIENT
Start: 2024-05-03 | End: 2024-05-16 | Stop reason: HOSPADM

## 2024-05-03 RX ORDER — NALTREXONE HYDROCHLORIDE 50 MG/1
25 TABLET, FILM COATED ORAL DAILY
Status: DISCONTINUED | OUTPATIENT
Start: 2024-05-03 | End: 2024-05-04

## 2024-05-03 RX ORDER — LANOLIN ALCOHOL/MO/W.PET/CERES
100 CREAM (GRAM) TOPICAL DAILY
Status: DISCONTINUED | OUTPATIENT
Start: 2024-05-03 | End: 2024-05-16 | Stop reason: HOSPADM

## 2024-05-03 RX ORDER — NALTREXONE HYDROCHLORIDE 50 MG/1
50 TABLET, FILM COATED ORAL DAILY
Status: DISCONTINUED | OUTPATIENT
Start: 2024-05-04 | End: 2024-05-16 | Stop reason: HOSPADM

## 2024-05-03 RX ORDER — NICOTINE 21 MG/24HR
14 PATCH, TRANSDERMAL 24 HOURS TRANSDERMAL DAILY
Status: DISCONTINUED | OUTPATIENT
Start: 2024-05-03 | End: 2024-05-16 | Stop reason: HOSPADM

## 2024-05-03 RX ORDER — FOLIC ACID 1 MG/1
1 TABLET ORAL DAILY
Status: DISCONTINUED | OUTPATIENT
Start: 2024-05-03 | End: 2024-05-16 | Stop reason: HOSPADM

## 2024-05-03 RX ORDER — LORAZEPAM 2 MG/ML
1 INJECTION INTRAMUSCULAR EVERY 8 HOURS PRN
Status: DISCONTINUED | OUTPATIENT
Start: 2024-05-03 | End: 2024-05-06

## 2024-05-03 RX ORDER — ONDANSETRON 4 MG/1
4 TABLET, ORALLY DISINTEGRATING ORAL EVERY 8 HOURS PRN
Status: DISCONTINUED | OUTPATIENT
Start: 2024-05-03 | End: 2024-05-16 | Stop reason: HOSPADM

## 2024-05-03 RX ADMIN — NICOTINE POLACRILEX 2 MG: 2 GUM, CHEWING ORAL at 08:48

## 2024-05-03 RX ADMIN — NICOTINE POLACRILEX 2 MG: 2 GUM, CHEWING ORAL at 15:59

## 2024-05-03 RX ADMIN — MULTIPLE VITAMINS W/ MINERALS TAB 1 TABLET: TAB ORAL at 08:17

## 2024-05-03 RX ADMIN — ERGOCALCIFEROL 50000 UNITS: 1.25 CAPSULE, LIQUID FILLED ORAL at 14:40

## 2024-05-03 RX ADMIN — NALTREXONE HYDROCHLORIDE 25 MG: 50 TABLET, FILM COATED ORAL at 11:35

## 2024-05-03 RX ADMIN — ATORVASTATIN CALCIUM 10 MG: 10 TABLET, FILM COATED ORAL at 16:42

## 2024-05-03 RX ADMIN — AMLODIPINE BESYLATE 5 MG: 5 TABLET ORAL at 11:43

## 2024-05-03 RX ADMIN — ESCITALOPRAM OXALATE 20 MG: 10 TABLET ORAL at 08:48

## 2024-05-03 RX ADMIN — CYANOCOBALAMIN TAB 1000 MCG 1000 MCG: 1000 TAB at 14:40

## 2024-05-03 RX ADMIN — THIAMINE HCL TAB 100 MG 100 MG: 100 TAB at 08:17

## 2024-05-03 RX ADMIN — MELATONIN TAB 3 MG 3 MG: 3 TAB at 21:32

## 2024-05-03 RX ADMIN — NICOTINE 14 MG: 14 PATCH, EXTENDED RELEASE TRANSDERMAL at 08:48

## 2024-05-03 RX ADMIN — FOLIC ACID 1 MG: 1 TABLET ORAL at 08:17

## 2024-05-03 NOTE — NURSING NOTE
Patient admitted under 201 status from [facility] ED for SI to drink self to death. Patient cooperative and tearful throughout admission questions. Per patient depressive thoughts began 1 week ago, patient then wrote a suicide note for sister and began to drink heavily. Patient reporting moderate depression on admission, denies anxiety, current SI, HI/AVH. Patient endorsing SA hx of 4-5 attempts by either drinking self to death or overdosing on pills, the last attempt was 1 year ago. Patient reports drinking 3 drinks twice a week, denies alcohol abuse hx. Skin intact, BMAT 4.

## 2024-05-03 NOTE — ASSESSMENT & PLAN NOTE
Lipid panel: triglycerides 132, HDL 72,    Will add statin  Patient will need repeat LFTs and lipid panel in 8 weeks which can be done outpatient

## 2024-05-03 NOTE — TREATMENT PLAN
TREATMENT PLAN REVIEW - Behavioral Health Christy Borjas 63 y.o. 1960 female MRN: [id number]    [facility] 6B [facility] Room / Bed: [facility] 646/[facility] 646-02 Encounter: [id number]          Admit Date/Time:  5/2/2024  9:58 PM    Treatment Team:   [facility]  Arabella Londonoo  Aide Cottrell, RONEL Watts, LMSW  JUAN DANIEL Carrasco CRNP    Diagnosis: Principal Problem:    Severe episode of recurrent major depressive disorder, without psychotic features (HCC)  Active Problems:    Alcohol use disorder      Patient Strengths/Assets: capable of independent living, cooperative, family ties, patient is on a voluntary commitment    Patient Barriers/Limitations: difficulty adapting, marital/family conflict, noncompliant with medication, alcohol abuse    Short Term Goals: decrease in depressive symptoms, decrease in anxiety symptoms, decrease in suicidal thoughts, decrease in self abusive behaviors, decrease in homicidal thoughts, ability to stay safe on the unit, ability to stay free of restraints, improvement in insight, mood stabilization    Long Term Goals: improvement in depression, improvement in anxiety, stabilization of mood, free of suicidal thoughts, free of homicidal thoughts, no self abusive behavior, improved insight, acceptance of need for psychiatric medications, acceptance of need for psychiatric follow up after discharge, establishment of family support upon discharge    Progress Towards Goals: starting psychiatric medications as prescribed    Recommended Treatment: medication management, patient medication education, group therapy, milieu therapy, continued Behavioral Health psychiatric evaluation/assessment process    Treatment Frequency: daily medication monitoring, group and milieu therapy daily, monitoring through interdisciplinary rounds,  monitoring through weekly patient care conferences    Expected Discharge Date:  14 days    Discharge Plan: referral for outpatient drug and alcohol counseling, referral for outpatient medication management with a psychiatrist, referral for outpatient psychotherapy, return to previous living arrangement    Treatment Plan Created/Updated By: Levar Lira MD

## 2024-05-03 NOTE — PROGRESS NOTES
05/03/24 1627   Admission   Release of Information Signed Yes  ([facility]; Irais Moncada (sister) [phone])

## 2024-05-03 NOTE — PLAN OF CARE
Problem: Alteration in Thoughts and Perception  Goal: Treatment Goal: Gain control of psychotic behaviors/thinking, reduce/eliminate presenting symptoms and demonstrate improved reality functioning upon discharge  Outcome: Not Progressing  Goal: Verbalize thoughts and feelings  Description: Interventions:  - Promote a nonjudgmental and trusting relationship with the patient through active listening and therapeutic communication  - Assess patient's level of functioning, behavior and potential for risk  - Engage patient in 1 on 1 interactions  - Encourage patient to express fears, feelings, frustrations, and discuss symptoms    - [place] patient to reality, help patient recognize reality-based thinking   - Administer medications as ordered and assess for potential side effects  - Provide the patient education related to the signs and symptoms of the illness and desired effects of prescribed medications  Outcome: Not Progressing  Goal: Refrain from acting on delusional thinking/internal stimuli  Description: Interventions:  - Monitor patient closely, per order   - Utilize least restrictive measures   - Set reasonable limits, give positive feedback for acceptable   - Administer medications as ordered and monitor of potential side effects  Outcome: Not Progressing  Goal: Agree to be compliant with medication regime, as prescribed and report medication side effects  Description: Interventions:  - Offer appropriate PRN medication and supervise ingestion; conduct AIMS, as needed   Outcome: Not Progressing  Goal: Recognize dysfunctional thoughts, communicate reality-based thoughts at the time of discharge  Description: Interventions:  - Provide medication and psycho-education to assist patient in compliance and developing insight into his/her illness   Outcome: Not Progressing  Goal: Complete daily ADLs, including personal hygiene independently, as able  Description: Interventions:  - Observe, teach, and assist patient with  ADLS  - Monitor and promote a balance of rest/activity, with adequate nutrition and elimination   Outcome: Not Progressing

## 2024-05-03 NOTE — ASSESSMENT & PLAN NOTE
Admission labs: CBC, CMP, TSH acceptable  Folate, B12, Vitamin D 25 hydroxy labs pending  Vitals stable   UA with 2+ ketones - encourage oral hydration   UDS positive for negative   EKG reveals NSR, 59 bpm   Patient is medically cleared for admission to BHU and treatment of underlying psychiatric illness based on available results  Please contact [facility] with any questions or concerns

## 2024-05-03 NOTE — ASSESSMENT & PLAN NOTE
BP elevated since arrival, possible anxiety  Will add norvasc 5 mg daily  If BP improve during stay consider discontinuing norvasc

## 2024-05-03 NOTE — PROGRESS NOTES
05/03/24 1624   Team Meeting   Meeting Type Tx Team Meeting   Initial Conference Date 05/03/24   Next Conference Date 05/31/24   Team Members Present   Team Members Present Physician;Nurse;   Physician Team Member Dr Lira   Nursing Team Member Chrissie   Care Management Team Member Madai   Patient/Family Present   Patient Present Yes   Patient's Family Present No   OTHER   Team Meeting - Additional Comments Met with the patient to go over her treamtent plan. Goals discussed were to have a decrease in depressive symptoms, decrease in anxiety symptoms, decrease in suicidal thoughts, decrease in self abusive behaviors, decrease in homicidal thoughts, ability to stay safe on the unit, ability to stay free of restraints, improvement in insight, and mood stabilization. She can attain these goals with medication management and group/milieu therapy. Christy agrees to her treatment plan and signed.

## 2024-05-03 NOTE — PROGRESS NOTES
05/03/24 1626   Referral Data   Referral Reason Psych   County Information   County of Residence [facility]   Readmission Root Cause   30 Day Readmission No   Patient Information   Mental Status Alert   Primary Caregiver Self   Support System Immediate family   Judaism/Cultural Requests None   Legal Information   Tx Plan Signed Yes   Current Status: 201   Legal Issues denies   Health Care Proxy Appointed No   Activities of Daily Living Prior to Admission   Functional Status Independent   Assistive Device No device needed   Living Arrangement Lives with someone  (Lives with sister)   Ambulation Independent   Access to Firearms   Access to Firearms No   Income Information   Income Source Employed   Means of Transportation   Means of Transport to Appts: Drives Self

## 2024-05-03 NOTE — EMTALA/ACUTE CARE TRANSFER
[facility] EMERGENCY DEPARTMENT  [address] [place] [id number]  Dept: [phone]      EMTALA TRANSFER CONSENT    NAME Christy ROBERSON 1960                              MRN [id number]    I have been informed of my rights regarding examination, treatment, and transfer   by Dr. Deshawn Sanders MD    Benefits: Specialized equipment and/or services available at the receiving facility (Include comment)________________________    Risks: Potential for delay in receiving treatment      Transfer Request   I acknowledge that my medical condition has been evaluated and explained to me by the emergency department physician or other qualified medical person and/or my attending physician who has recommended and offered to me further medical examination and treatment. I understand the Hospital's obligation with respect to the treatment and stabilization of my emergency medical condition. I nevertheless request to be transferred. I release the Hospital, the doctor, and any other persons caring for me from all responsibility or liability for any injury or ill effects that may result from my transfer and agree to accept all responsibility for the consequences of my choice to transfer, rather than receive stabilizing treatment at the Hospital. I understand that because the transfer is my request, my insurance may not provide reimbursement for the services.  The Hospital will assist and direct me and my family in how to make arrangements for transfer, but the hospital is not liable for any fees charged by the transport service.  In spite of this understanding, I refuse to consent to further medical examination and treatment which has been offered to me, and request transfer to Accepting Facility Name, City & State : [facility]. I authorize the performance of emergency medical procedures and treatments upon me in both transit and upon arrival at the  receiving facility.  Additionally, I authorize the release of any and all medical records to the receiving facility and request they be transported with me, if possible.    I authorize the performance of emergency medical procedures and treatments upon me in both transit and upon arrival at the receiving facility.  Additionally, I authorize the release of any and all medical records to the receiving facility and request they be transported with me, if possible.  I understand that the safest mode of transportation during a medical emergency is an ambulance and that the Hospital advocates the use of this mode of transport. Risks of traveling to the receiving facility by car, including absence of medical control, life sustaining equipment, such as oxygen, and medical personnel has been explained to me and I fully understand them.    (ASIF CORRECT BOX BELOW)  [  ]  I consent to the stated transfer and to be transported by ambulance/helicopter.  [  ]  I consent to the stated transfer, but refuse transportation by ambulance and accept full responsibility for my transportation by car.  I understand the risks of non-ambulance transfers and I exonerate the Hospital and its staff from any deterioration in my condition that results from this refusal.    X___________________________________________    DATE  24  TIME________  Signature of patient or legally responsible individual signing on patient behalf           RELATIONSHIP TO PATIENT_________________________          Provider Certification    NAME Christy Borjas                                        [facility] 1960                              MRN [id number]    A medical screening exam was performed on the above named patient.  Based on the examination:    Condition Necessitating Transfer The encounter diagnosis was Emotional crisis.    Patient Condition: The patient has been stabilized such that within reasonable medical probability, no material deterioration of the  patient condition or the condition of the unborn child(theresa) is likely to result from the transfer    Reason for Transfer: Level of Care needed not available at this facility    Transfer Requirements: Facility [facility] available and qualified personnel available for treatment as acknowledged by    Agreed to accept transfer and to provide appropriate medical treatment as acknowledged by       Dr. Marie  Appropriate medical records of the examination and treatment of the patient are provided at the time of transfer   STAFF INITIAL WHEN COMPLETED _______  Transfer will be performed by qualified personnel from    and appropriate transfer equipment as required, including the use of necessary and appropriate life support measures.    Provider Certification: I have examined the patient and explained the following risks and benefits of being transferred/refusing transfer to the patient/family:  General risk, such as traffic hazards, adverse weather conditions, rough terrain or turbulence, possible failure of equipment (including vehicle or aircraft), or consequences of actions of persons outside the control of the transport personnel      Based on these reasonable risks and benefits to the patient and/or the unborn child(theresa), and based upon the information available at the time of the patient’s examination, I certify that the medical benefits reasonably to be expected from the provision of appropriate medical treatments at another medical facility outweigh the increasing risks, if any, to the individual’s medical condition, and in the case of labor to the unborn child, from effecting the transfer.    X____________________________________________ DATE 05/02/24        TIME_______      ORIGINAL - SEND TO MEDICAL RECORDS   COPY - SEND WITH PATIENT DURING TRANSFER

## 2024-05-03 NOTE — ASSESSMENT & PLAN NOTE
Per sister, patient is drinking on a daily basis. Patient denies daily use.  Monitor on CIWA  Continue folic acid, thiamine, and MVI

## 2024-05-03 NOTE — PROGRESS NOTES
05/03/24 0800   Team Meeting   Meeting Type Daily Rounds   Initial Conference Date 05/03/24   Next Conference Date 05/06/24   Team Members Present   Team Members Present Physician;Nurse;   Physician Team Member Dr Lira, Dr Lewis, Dr Wu, TOREY Suh   Nursing Team Member Radha Harrington   Care Management Team Member Madai   Patient/Family Present   Patient Present No   Patient's Family Present No     201, new admit, [facility] ED, SI to OD on pills and drink alcohol, stops meds x 1 week, sister reports that she is a daily drinker but patient denies this, on CIWA protocol, back on lexapro, tearful, hx SA 4 or 5 times, PRN trazodone, discharge pending
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
## 2024-05-03 NOTE — CONSULTS
[facility]  Consult  Name: Christy Borjas 63 y.o. female I MRN: [id number]  Unit/Bed#: [facility] 646-02 I Date of Admission: 5/2/2024   Date of Service: 5/3/2024 I Hospital Day: 1    Inpatient consult for Medical Clearance for  patient  Consult performed by: SHILOH Bruno  Consult ordered by: Sasha Marie MD        Assessment/Plan   Medical clearance for psychiatric admission  Assessment & Plan  Admission labs: CBC, CMP, TSH acceptable  Folate, B12, Vitamin D 25 hydroxy labs pending  Vitals stable   UA with 2+ ketones - encourage oral hydration   UDS positive for negative   EKG reveals NSR, 59 bpm   Patient is medically cleared for admission to [facility] and treatment of underlying psychiatric illness based on available results  Please contact SLIM with any questions or concerns    Hypertension  Assessment & Plan  BP elevated since arrival, possible anxiety  Will add norvasc 5 mg daily  If BP improve during stay consider discontinuing norvasc     Dyslipidemia  Assessment & Plan  Lipid panel: triglycerides 132, HDL 72,    Will add statin  Patient will need repeat LFTs and lipid panel in 8 weeks which can be done outpatient     Alcohol use disorder  Assessment & Plan  Per sister, patient is drinking on a daily basis. Patient denies daily use.  Monitor on CIWA  Continue folic acid, thiamine, and MVI     Tobacco use  Assessment & Plan  Smoking cessation education and counseling   Nicotine patch while hospitalized     * Severe episode of recurrent major depressive disorder, without psychotic features (HCC)  Assessment & Plan  [facility]  Management per primary service            Recommendations for Discharge:  SLIM will sign off - please call with questions or concerns.  Follow up with PCP upon discharge.     Counseling / Coordination of Care Time: 30 minutes.  Greater than 50% of total time spent on patient counseling and coordination of  care.    Collaboration of Care: Were Recommendations Directly Discussed with Primary Treatment Team? - Yes     History of Present Illness:    Christy Borjas is a 63 y.o. female with a PMH including MDD and alcohol abuse who is originally admitted to the psychiatric service due to worsening depression. We are consulted for medical clearance for psychiatric hospitalization and medical management. Initially, [facility]. Patient's sister provided a suicide note from the patient and reports patient has a long standing history of alcoholism. Patient was seen by Crisis. She signed a 201 and [facility]. Currently, she is calm and cooperative. She offers no complaints.     Review of Systems:    Review of Systems   Constitutional:  Negative for chills and fever.   HENT:  Negative for ear pain and sore throat.    Eyes:  Negative for pain and visual disturbance.   Respiratory:  Negative for cough and shortness of breath.    Cardiovascular:  Negative for chest pain and palpitations.   Gastrointestinal:  Negative for abdominal pain and vomiting.   Genitourinary:  Negative for dysuria and hematuria.   Musculoskeletal:  Negative for arthralgias and back pain.   Skin:  Negative for color change and rash.   Neurological:  Negative for seizures and syncope.   All other systems reviewed and are negative.      Past Medical and Surgical History:     No past medical history on file.    No past surgical history on file.    Meds/Allergies:    all medications and allergies reviewed    Allergies:   Allergies   Allergen Reactions    Shellfish-Derived Products - Food Allergy Anaphylaxis    Sulfa Antibiotics Rash       Social History:     Marital Status: /Civil Union    Substance Use History:   Social History     Substance and Sexual Activity   Alcohol Use Not on file     Social History     Tobacco Use   Smoking Status Former    Types: Cigarettes   Smokeless Tobacco Current     Social History     Substance and  Sexual Activity   Drug Use Not on file       Family History:    No family history on file.    Physical Exam:     Vitals:   Blood Pressure: 167/86 (05/03/24 0736)  Pulse: 77 (05/03/24 0736)  Temperature: 99 °F (37.2 °C) (05/03/24 0736)  Temp Source: Temporal (05/03/24 0736)  Respirations: 18 (05/03/24 0736)  SpO2: 98 % (05/03/24 0736)    Physical Exam  Vitals and nursing note reviewed.   Constitutional:       General: She is not in acute distress.     Appearance: She is not toxic-appearing or diaphoretic.   HENT:      Head: Normocephalic.      Mouth/Throat:      Mouth: Mucous membranes are moist.   Eyes:      Conjunctiva/sclera: Conjunctivae normal.   Cardiovascular:      Rate and Rhythm: Normal rate.   Pulmonary:      Effort: Pulmonary effort is normal.      Breath sounds: Normal breath sounds.   Abdominal:      General: Bowel sounds are normal.      Palpations: Abdomen is soft.   Musculoskeletal:         General: Normal range of motion.      Cervical back: Normal range of motion.      Right lower leg: No edema.      Left lower leg: No edema.   Skin:     General: Skin is warm and dry.      Capillary Refill: Capillary refill takes less than 2 seconds.   Neurological:      Mental Status: She is alert and oriented to person, place, and time. Mental status is at baseline.   Psychiatric:         Mood and Affect: Mood is depressed. Affect is flat.         Speech: Speech normal.         Behavior: Behavior is cooperative.         Additional Data:     Lab Results:     Results from last 7 days   Lab Units 05/03/24  1031 05/03/24  0439   WBC Thousand/uL  --  4.93   HEMOGLOBIN g/dL  --  12.6   HEMATOCRIT %  --  40.6   PLATELETS Thousands/uL 218 218   SEGS PCT %  --  50   LYMPHO PCT %  --  31   MONO PCT %  --  13*   EOS PCT %  --  5     Results from last 7 days   Lab Units 05/03/24  0439   SODIUM mmol/L 136   POTASSIUM mmol/L 3.7   CHLORIDE mmol/L 99   CO2 mmol/L 25   BUN mg/dL 15   CREATININE mg/dL 0.41*   ANION GAP mmol/L 12  "  CALCIUM mg/dL 9.0   ALBUMIN g/dL 3.8   TOTAL BILIRUBIN mg/dL 0.71   ALK PHOS U/L 46   ALT U/L 8   AST U/L 20   GLUCOSE RANDOM mg/dL 75     Results from last 7 days   Lab Units 05/03/24  1031   INR  0.91         No results found for: \"HGBA1C\"            Imaging:  no new imaging to review     No orders to display       EKG, Pathology, and Other Studies Reviewed on Admission:   EKG: see above documentation    ** Please Note: This note has been constructed using a voice recognition system. **   "

## 2024-05-03 NOTE — CASE MANAGEMENT
Spoke to the patient's sister, Irais Moncada ([phone]), she advised that this is her sisters 3rd or 4th suicide attempt, she said that she has found vodka in water bottles in her car. She reported that her sister has two choices:  Discharge to [place]  Discharge to sisters home and go to daily therapy and AA meetings and take all of her medications.      The sister reports she will not have her sister die in her home. She said the day that she was going to commit suicide she has (2) 750 ml bottles of vodka and (12) one shot bottles of vodka and she was going to go to the park and drink and just go to sleep.     The sister reports that Christy does not have a job anymore, the chiropractor told the sister that she no longer has a job. Per the sister Christy has been asking her spouse in [place] to come visit and move to [place] and he has refused. The sister said that when she lived in [place] she was psychotic, she was paranoid and would hid in her shed for days.      The sister reported that their mother had these same behaviors when they were growing up.

## 2024-05-03 NOTE — NURSING NOTE
Pt is pleasant on approach this morning. She reports depression and denies other psych symptoms. She is med/meal compliant. Isolative to room at this time but social with her roommate. Pt is currently denying any unmet needs.

## 2024-05-03 NOTE — CASE MANAGEMENT
Case Management Assessment    Patient name Christy Borjas  Location /[facility] 646-02 MRN [id number]  : 1960 Date 5/3/2024       Current Admission Date: 2024  Current Admission Diagnosis:Severe episode of recurrent major depressive disorder, without psychotic features (HCC)   Patient Active Problem List    Diagnosis Date Noted    Severe episode of recurrent major depressive disorder, without psychotic features (HCC) 2024    Alcohol use disorder 2024    Medical clearance for psychiatric admission 2024    Tobacco use 2024    Dyslipidemia 2024    Hypertension 2024    Emotional crisis 2024      LOS (days): 1  Geometric Mean LOS (GMLOS) (days):   Days to GMLOS:     OBJECTIVE:    Risk of Unplanned Readmission Score: 10.5         Current admission status: Inpatient Psych  Referral Reason: Psych    Preferred Pharmacy:   [id number] - [place], [place] - [facility] [address] [address]  Phone: [phone] Fax: [phone]    Primary Care Provider: No primary care provider on file.    Primary Insurance: HEALTHCARE ASSISTANCE PENDING  Secondary Insurance:     ASSESSMENT:  Active Health Care Proxies    There are no active Health Care Proxies on file.                 Readmission Root Cause  30 Day Readmission: No    Patient Information  Mental Status: Alert  Primary Caregiver: Self              Patient Information Continued  Income Source: Employed  Current Status:: 201         Means of Transportation  Means of Transport to Appts:: Drives Self      Social Determinants of Health (SDOH)      Flowsheet Row Most Recent Value   Housing Stability    In the last 12 months, was there a time when you were not able to pay the mortgage or rent on time? N   In the last 12 months, how many places have you lived? 2   In the last 12 months, was there a time when you did not have a steady place to sleep or slept in a shelter (including  "now)? N   Transportation Needs    In the past 12 months, has lack of transportation kept you from medical appointments or from getting medications? no   In the past 12 months, has lack of transportation kept you from meetings, work, or from getting things needed for daily living? No   Food Insecurity    Within the past 12 months, you worried that your food would run out before you got the money to buy more. Never true   Within the past 12 months, the food you bought just didn't last and you didn't have money to get more. Never true   Utilities    In the past 12 months has the electric, gas, oil, or water company threatened to shut off services in your home? No          Biopsychosocial Assessment  Older Adult Behavioral Health Unit  Social Work Case Management Note  Madai Watts LMSW    Summary:   CM met with the patient to initiate assessment. Reviewed the admission and the role of CM.  63 y.o.  female,  (since November 2023; has 2 adult children and 3 grand-children in [place]), living w/ her sister, sister's , another roommate with his 3 y/o son, currently employed as a Chiropractor technician, w/ PPH of alcohol use disorder (two prior DUI's), depression, Anorexia Nervosa in remission, three prior psychiatric admissions (first about 10 years ago and last more than a year ago in [place]), three prior SA (via drinking heavily and OD on prescribed meds), who was BIB EMS to the [place] ED on 5/2/24 due to suicidal ideations and plan to OD. BAL: 309 on 5/2/24 at 8:30 AM. The patient signed 201 and got admitted to the inpatient psychiatry unit 6B for further psychiatric stabilization.    Presented calm, cooperative, and well related, dressed in hospital attire, w/ fair hygiene, good eye contact, depressed mood, constricted but reactive affect, talking in normal tone, volume and amount, w/ linear thought process, fair insight and judgement.  The patient reported feeling overwhelmed by \"lots of " "little stuff but nothing major\" over past couple of months.  She noted that she was leaving in [place] with his  but got  in 2023 and moved to the area to live with her sister.  She admitted history of alcohol abuse and 2 prior DUIs in the past.  She noted that her  is strictly against drinking alcohol given the family history of severe alcohol use disorder and psychiatric problems and their mother.  She stated that she \"disappointed [her] sister\" as she started drinking again about a month ago after was sober since January.  She was minimizing her drinking, stated that she drinks 3-4 drinks, about 4 days a week but noted that usually she drinks 1.5 L of vodka per week.   Patient Name: Christy Borjas    Address: [address] [place] [address]    Pharmacy: [id number] - [facility] [address] [address]     Insurance: none    /Age: 1960 - 63 y.o.    Admission Date: 24    Diagnosis/D&A: Severe episode of recurrent major depressive disorder, without psychotic features; Alcohol use disorder    County: [facility])    Commitment: 201     Admitted from: [facility] ED    POA/Guardian: denies    Living Situation: lives with sister and her spouse, friend Antwan and their 2-year-old in an apartment    Access to firearms: denies    Presenting Problem: Per ED, “patient's BAL was 309, Patient's sister, who is also patient's emergency contact came to visit with patient.  PES informed that she wasn't able to have visitors at this point.  Sister provided a suicide note from the patient and reporrts that patient has at least 3 other attempts which resulted in hospitalizations.  Sister says that patient has a long history of alcoholism and moved in with her sister in Oct/, and was living in [place] prior.  In addition, sister reports a very strong family history of substance abuse and mental illness.  Sister provided a prescription bottle of Trazodone from " "2023 which was given to RN to sent to Pharmacy.     62 yo SWF presented to ER this morning via police with a BAL of 309 @ 08:30 due to: \"Because I threatened to commit suicide to my sister - plan was to leave the house - drink myself to oblivion and to not wake up or then take pills\" - also wrote a suicide note.  The patient is not known to PES.  There were no identified stressors by the patient's report.  Mood = initially stated \"been OK\" - later stated - \"I have depression\".  Symptoms include: lethality concerns and probably minimizing her Etoh use; poor sleep of 5-6 hours; poor self esteem;.  The patient denies: HI; psychosis; paranoia; drug use; any change with concentration/energy level.  Reports a past hx of \"anxiety and panic d/o\" - but not having symptoms now. PES briefly spoke to patient's sister, Darnell @ [phone].”    Triggers for Hospitalization: SI, depression    Signs, symptoms, decompensation pattern: thinking of suicide    Previous psychiatric treatment: over 1 [place] ago in [place], 3 years ago in [place]    Psychiatrist: none    Therapist: none    ACT/ICM/CPS/WRT/SC: none    PCP: none    Psychiatric Medication history: Lexapro    Family mental health history: substance abuse, mom had dep/anx/bipolar/schizophrenia.    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  Transportation: drives sisters car    Financial/Benefit Information/Rep-Payee: employment income, no rep payee    Medical history: see medical chart    Substance Use/Tobacco Use: alcohol - daily (4-5) times a week " vodka 1.75L bottle a week/vapes 1 ½ carts per week  UDS/Identified Substance(s) used: negative  Risks discussed included: yes  Recommendations discussed: yes  Patient's response: no insurance     Trauma/Abuse/Losses: emotional, mom passed away 5 years ago    Coping Skills: drink, vape, curl in bed, hug dog    Strengths/Supports/Protective Factors: dog    Barriers/Limitations in Recovery: insurance/housing    Patient identified goals for treatment: do her time and get out of here    BING's obtained: [facility]; Irais Antwan (sister) [phone]    Discharge Disposition: referral for outpatient drug and alcohol counseling, referral for outpatient medication management with a psychiatrist, referral for outpatient psychotherapy, return to previous living arrangement

## 2024-05-04 PROCEDURE — 99232 SBSQ HOSP IP/OBS MODERATE 35: CPT | Performed by: STUDENT IN AN ORGANIZED HEALTH CARE EDUCATION/TRAINING PROGRAM

## 2024-05-04 RX ORDER — ARIPIPRAZOLE 2 MG/1
2 TABLET ORAL DAILY
Status: DISCONTINUED | OUTPATIENT
Start: 2024-05-04 | End: 2024-05-06

## 2024-05-04 RX ADMIN — NICOTINE POLACRILEX 2 MG: 2 GUM, CHEWING ORAL at 13:58

## 2024-05-04 RX ADMIN — TRAZODONE HYDROCHLORIDE 50 MG: 50 TABLET ORAL at 21:09

## 2024-05-04 RX ADMIN — ESCITALOPRAM OXALATE 20 MG: 10 TABLET ORAL at 08:15

## 2024-05-04 RX ADMIN — ATORVASTATIN CALCIUM 10 MG: 10 TABLET, FILM COATED ORAL at 16:49

## 2024-05-04 RX ADMIN — AMLODIPINE BESYLATE 5 MG: 5 TABLET ORAL at 08:16

## 2024-05-04 RX ADMIN — ARIPIPRAZOLE 2 MG: 2 TABLET ORAL at 11:53

## 2024-05-04 RX ADMIN — CYANOCOBALAMIN TAB 1000 MCG 1000 MCG: 1000 TAB at 08:16

## 2024-05-04 RX ADMIN — NICOTINE 14 MG: 14 PATCH, EXTENDED RELEASE TRANSDERMAL at 08:21

## 2024-05-04 RX ADMIN — FOLIC ACID 1 MG: 1 TABLET ORAL at 08:16

## 2024-05-04 RX ADMIN — THIAMINE HCL TAB 100 MG 100 MG: 100 TAB at 08:16

## 2024-05-04 RX ADMIN — NICOTINE POLACRILEX 2 MG: 2 GUM, CHEWING ORAL at 08:29

## 2024-05-04 RX ADMIN — MELATONIN TAB 3 MG 3 MG: 3 TAB at 21:09

## 2024-05-04 RX ADMIN — NALTREXONE HYDROCHLORIDE 50 MG: 50 TABLET, FILM COATED ORAL at 08:15

## 2024-05-04 RX ADMIN — MULTIPLE VITAMINS W/ MINERALS TAB 1 TABLET: TAB ORAL at 08:15

## 2024-05-04 NOTE — NURSING NOTE
Patient seen in room reading. Patient calm and cooperative. Patient med compliant. Denies SI/HI/AH/VH. CIWA done at 12pm score of 1. Denies unmet needs at this time. Ate 100% lunch. Continual safety checks ongoing

## 2024-05-04 NOTE — PLAN OF CARE
Problem: Alteration in Thoughts and Perception  Goal: Treatment Goal: Gain control of psychotic behaviors/thinking, reduce/eliminate presenting symptoms and demonstrate improved reality functioning upon discharge  Outcome: Progressing  Goal: Verbalize thoughts and feelings  Description: Interventions:  - Promote a nonjudgmental and trusting relationship with the patient through active listening and therapeutic communication  - Assess patient's level of functioning, behavior and potential for risk  - Engage patient in 1 on 1 interactions  - Encourage patient to express fears, feelings, frustrations, and discuss symptoms    - [place] patient to reality, help patient recognize reality-based thinking   - Administer medications as ordered and assess for potential side effects  - Provide the patient education related to the signs and symptoms of the illness and desired effects of prescribed medications  Outcome: Progressing  Goal: Refrain from acting on delusional thinking/internal stimuli  Description: Interventions:  - Monitor patient closely, per order   - Utilize least restrictive measures   - Set reasonable limits, give positive feedback for acceptable   - Administer medications as ordered and monitor of potential side effects  Outcome: Progressing  Goal: Agree to be compliant with medication regime, as prescribed and report medication side effects  Description: Interventions:  - Offer appropriate PRN medication and supervise ingestion; conduct AIMS, as needed   Outcome: Progressing  Goal: Recognize dysfunctional thoughts, communicate reality-based thoughts at the time of discharge  Description: Interventions:  - Provide medication and psycho-education to assist patient in compliance and developing insight into his/her illness   Outcome: Progressing  Goal: Complete daily ADLs, including personal hygiene independently, as able  Description: Interventions:  - Observe, teach, and assist patient with ADLS  - Monitor and  promote a balance of rest/activity, with adequate nutrition and elimination   Outcome: Progressing     Problem: Ineffective Coping  Goal: Cooperates with admission process  Description: Interventions:   - Complete admission process  Outcome: Progressing  Goal: Identifies ineffective coping skills  Outcome: Progressing  Goal: Identifies healthy coping skills  Outcome: Progressing  Goal: Demonstrates healthy coping skills  Outcome: Progressing  Goal: Patient/Family participate in treatment and DC plans  Description: Interventions:  - Provide therapeutic environment  Outcome: Progressing  Goal: Patient/Family verbalizes awareness of resources  Outcome: Progressing  Goal: Understands least restrictive measures  Description: Interventions:  - Utilize least restrictive behavior  Outcome: Progressing  Goal: Free from restraint events  Description: - Utilize least restrictive measures   - Provide behavioral interventions   - Redirect inappropriate behaviors   Outcome: Progressing     Problem: Risk for Self Injury/Neglect  Goal: Treatment Goal: Remain safe during length of stay, learn and adopt new coping skills, and be free of self-injurious ideation, impulses and acts at the time of discharge  Outcome: Progressing  Goal: Verbalize thoughts and feelings  Description: Interventions:  - Assess and re-assess patient's lethality and potential for self-injury  - Engage patient in 1:1 interactions, daily, for a minimum of 15 minutes  - Encourage patient to express feelings, fears, frustrations, hopes  - Establish rapport/trust with patient   Outcome: Progressing  Goal: Refrain from harming self  Description: Interventions:  - Monitor patient closely, per order  - Develop a trusting relationship  - Supervise medication ingestion, monitor effects and side effects   Outcome: Progressing  Goal: Recognize maladaptive responses and adopt new coping mechanisms  Outcome: Progressing  Goal: Complete daily ADLs, including personal hygiene  independently, as able  Description: Interventions:  - Observe, teach, and assist patient with ADLS  - Monitor and promote a balance of rest/activity, with adequate nutrition and elimination  Outcome: Progressing     Problem: Depression  Goal: Treatment Goal: Demonstrate behavioral control of depressive symptoms, verbalize feelings of improved mood/affect, and adopt new coping skills prior to discharge  Outcome: Progressing  Goal: Verbalize thoughts and feelings  Description: Interventions:  - Assess and re-assess patient's level of risk   - Engage patient in 1:1 interactions, daily, for a minimum of 15 minutes   - Encourage patient to express feelings, fears, frustrations, hopes   Outcome: Progressing  Goal: Refrain from harming self  Description: Interventions:  - Monitor patient closely, per order   - Supervise medication ingestion, monitor effects and side effects   Outcome: Progressing  Goal: Refrain from isolation  Description: Interventions:  - Develop a trusting relationship   - Encourage socialization   Outcome: Progressing  Goal: Refrain from self-neglect  Outcome: Progressing  Goal: Complete daily ADLs, including personal hygiene independently, as able  Description: Interventions:  - Observe, teach, and assist patient with ADLS  -  Monitor and promote a balance of rest/activity, with adequate nutrition and elimination   Outcome: Progressing     Problem: Anxiety  Goal: Anxiety is at manageable level  Description: Interventions:  - Assess and monitor patient's anxiety level.   - Monitor for signs and symptoms (heart palpitations, chest pain, shortness of breath, headaches, nausea, feeling jumpy, restlessness, irritable, apprehensive).   - Collaborate with interdisciplinary team and initiate plan and interventions as ordered.  - [place] patient to unit/surroundings  - Explain treatment plan  - Encourage participation in care  - Encourage verbalization of concerns/fears  - Identify coping mechanisms  - Assist  in developing anxiety-reducing skills  - Administer/offer alternative therapies  - Limit or eliminate stimulants  Outcome: Progressing     Problem: Alteration in Orientation  Goal: Treatment Goal: Demonstrate a reduction of confusion and improved orientation to person, place, time and/or situation upon discharge, according to optimum baseline/ability  Outcome: Progressing  Goal: Interact with staff daily  Description: Interventions:  - Assess and re-assess patient's level of orientation  - Engage patient in 1 on 1 interactions, daily, for a minimum of 15 minutes   - Establish rapport/trust with patient   Outcome: Progressing     Problem: DISCHARGE PLANNING - CARE MANAGEMENT  Goal: Discharge to post-acute care or home with appropriate resources  Description: INTERVENTIONS:  - Conduct assessment to determine patient/family and health care team treatment goals, and need for post-acute services based on payer coverage, community resources, and patient preferences, and barriers to discharge  - Address psychosocial, clinical, and financial barriers to discharge as identified in assessment in conjunction with the patient/family and health care team  - Arrange appropriate level of post-acute services according to patient’s   needs and preference and payer coverage in collaboration with the physician and health care team  - Communicate with and update the patient/family, physician, and health care team regarding progress on the discharge plan  - Arrange appropriate transportation to post-acute venues  Outcome: Progressing

## 2024-05-04 NOTE — NURSING NOTE
Patient is mostly withdrawn to room this shift and is seen reading a book. She endorses anxiety and complains about nicotine cravings. PRN nicorette gum administered at 0829. She is calm and cooperative. Appetite is fair. Encouraged to inform staff of any needs or concerns. Safety checks ongoing.

## 2024-05-04 NOTE — PROGRESS NOTES
"  Progress Note - Behavioral Health   Christy Borjas 63 y.o. female MRN: [id number]  Unit/Bed#: [facility] 646-02 Encounter: [id number]    Patient was visited on unit for continuing care; chart reviewed and discussed with multidisciplinary treatment team.  On approach, the patient was calm and cooperative. Reported slightly better mood but reported \"a lot of worries\". Denied any changes in appetite, and energy level. No problem initiating and maintaining sleep.  Denied A/VH currently.  She admitted having bottles of water filled with Vodka in her car to hide them from her sister, but denied driving while intoxicated. She reported fleeting suicidal ideations last night, but was able to control her intrusive thoughts. The patient became tearful while was talking about suicide, and the suicide note wrote to her sister. Denied active SI/HI, intent or plan upon direct inquiry at this time. The patient agreed to verbalize any suicidal thoughts, frustrations or concerns to the nursing staff, immediately.    Patient continues to be intermittently visible in the milieu and interacts with select staff and peers. No reports of aggression or self-injurious behavior on unit. No PRN medications used in the past 24 hours.    Patient accepted all offered medications and reported feeling better. No adverse effects of medications noted or reported.  The patient was restarted on Lexapro 20 mg p.o. daily.  Naltrexone increased to 50 mg p.o. daily for alcohol abuse and started on Abilify 2 mg p.o. daily as the adjunct treatment for depression.  The patient will benefit from referral to dual diagnosis services individual psychotherapy upon further psychiatric stabilization.    Current Mental Status Evaluation:  Appearance and attitude: appeared as stated age, dressed in hospital attire, with good hygiene, reading a book in her bed  Eye contact: good  Motor Function: within normal limits, No PMA/PMR  Gait/station: Not observed  Speech: normal " for rate, rhythm, volume, latency, amount  Language: No overt abnormality  Mood/affect: depressed, anxious / Affect was constricted but reactive, mood congruent, tearful  Thought Processes: linear  Thought content: denied suicidal ideations or homicidal ideations, no overt delusions elicited, negative thinking, intrusive thoughts, ruminating thoughts  Associations: intact associations  Perceptual disturbances: denies Auditory/Visual/Tactile Hallucinations  Orientation: oriented to time, person, place and to the situational context  Cognitive Function: intact  Memory: recent and remote memory grossly intact  Intellect: average  Fund of knowledge: aware of current events, aware of past history, and vocabulary average  Impulse control: good  Insight/judgment: fair/fair    Pain: denied  Pain scale: 0      Vital signs in last 24 hours:    Temp:  [97.3 °F (36.3 °C)-98.8 °F (37.1 °C)] 97.3 °F (36.3 °C)  HR:  [67-83] 83  Resp:  [18] 18  BP: (138-149)/(85-90) 149/90    Laboratory results: I have personally reviewed all pertinent laboratory/tests results    Results from the past 24 hours: No results found for this or any previous visit (from the past 24 hour(s)).    Progress Toward Goals: slight improvement    Assessment:  Principal Problem:    Severe episode of recurrent major depressive disorder, without psychotic features (HCC)  Active Problems:    Alcohol use disorder    Medical clearance for psychiatric admission    Tobacco use    Dyslipidemia    Hypertension        Plan:  - f/u [facility] recs regarding the medical problems   - Continue medication titration and treatment plan; adjust medication to optimize treatment response and as clinically indicated.       Scheduled medications:  Current Facility-Administered Medications   Medication Dose Route Frequency Provider Last Rate    acetaminophen  650 mg Oral Q6H PRN Sasha Marie MD      acetaminophen  650 mg Oral Q4H PRN Sasha Marie MD      acetaminophen  975 mg Oral  Q6H PRN Sasha Marie MD      aluminum-magnesium hydroxide-simethicone  30 mL Oral Q4H PRN Sasha Marie MD      amLODIPine  5 mg Oral Daily Nellie Matthewsrakesh, SHILOH      ARIPiprazole  2 mg Oral Daily Levar Lira MD      atorvastatin  10 mg Oral Daily With Dinner Nellie Gama Elizabeth, CRNP      haloperidol lactate  2.5 mg Intramuscular Q4H PRN Max 4/day Sasha Marie MD      And    LORazepam  1 mg Intramuscular Q4H PRN Max 4/day Sasha Marie MD      And    benztropine  0.5 mg Intramuscular Q4H PRN Max 4/day Sasha Marie MD      haloperidol lactate  5 mg Intramuscular Q4H PRN Max 4/day Sasha Marie MD      And    LORazepam  2 mg Intramuscular Q4H PRN Max 4/day Sasha Marie MD      And    benztropine  1 mg Intramuscular Q4H PRN Max 4/day Sasha Marie MD      benztropine  1 mg Oral Q4H PRN Max 6/day Sasha Marie MD      bisacodyl  10 mg Rectal Daily PRN Sasha Marie MD      cyanocobalamin  1,000 mcg Oral Daily Nellie Matthewsrakesh, SHILOH      hydrOXYzine HCL  50 mg Oral Q6H PRN Max 4/day Sasha Marie MD      Or    diphenhydrAMINE  50 mg Intramuscular Q6H PRN Sasha Marie MD      ergocalciferol  50,000 Units Oral Weekly Nellie Gama Elizabeth, SHILOH      escitalopram  20 mg Oral Daily Levar Lira MD      folic acid  1 mg Oral Daily Levar Lira MD      haloperidol  1 mg Oral Q6H PRN Sasha Marie MD      haloperidol  2.5 mg Oral Q4H PRN Max 4/day Sasha Marie MD      haloperidol  5 mg Oral Q4H PRN Max 4/day Sasha Marie MD      hydrOXYzine HCL  100 mg Oral Q6H PRN Max 4/day Sasha Marie MD      Or    LORazepam  2 mg Intramuscular Q6H PRN Sasha Marie MD      hydrOXYzine HCL  25 mg Oral Q6H PRN Max 4/day Sasha Marie MD      LORazepam  1 mg Oral Q8H PRN Levar Lira MD      Or    LORazepam  1 mg Intramuscular Q8H PRN Levar Lira MD      melatonin  3 mg Oral HS Sasha Marie MD      multivitamin-minerals  1 tablet  Oral Daily Levar Lira MD      naltrexone  50 mg Oral Daily Levar Lira MD      nicotine  14 mg Transdermal Daily SHILOH Bruno      nicotine polacrilex  2 mg Oral Q2H PRN SHILOH Bruno      ondansetron  4 mg Oral Q8H PRN SHILOH Bruno      polyethylene glycol  17 g Oral Daily PRN Sasha Marie MD      senna-docusate sodium  1 tablet Oral Daily PRN Sasha Marie MD      Thiamine Mononitrate  100 mg Oral Daily Levar Lira MD      traZODone  50 mg Oral HS PRN Sasha Marie MD          PRN:    acetaminophen    acetaminophen    acetaminophen    aluminum-magnesium hydroxide-simethicone    haloperidol lactate **AND** LORazepam **AND** benztropine    haloperidol lactate **AND** LORazepam **AND** benztropine    benztropine    bisacodyl    hydrOXYzine HCL **OR** diphenhydrAMINE    haloperidol    haloperidol    haloperidol    hydrOXYzine HCL **OR** LORazepam    hydrOXYzine HCL    LORazepam **OR** LORazepam    nicotine polacrilex    ondansetron    polyethylene glycol    senna-docusate sodium    traZODone    - Observation: routine            - VS: as per unit protocol  - Diet: Regular diet     - Psychoeducation (benefits and potential risks) discussed, importance of compliance with the psychiatric treatment reiterated, and the patient verbalized understanding of the matter   - Encourage group attendance and milieu therapy    - Dispo: TBD       Next of Kin  Extended Emergency Contact Information  Primary Emergency Contact: AntwanIrais  Mobile Phone: [phone]  Relation: Sister      Counseling / Coordination of Care    Patient's progress reviewed with nursing staff.  Medications, treatment progress and treatment plan reviewed with patient.  Medication changes discussed with patient.  Medication education provided to patient.  Coping with stress reviewed with patient.  Coping skills reviewed with patient.  Reassurance and supportive therapy provided.  Reoriented  to reality and reassured.  Encouraged participation in milieu and group therapy on the unit.     Levar Lira MD  Attending Psychiatrist   [facility]         This note was completed in part utilizing Dragon dictation Software. Grammatical, translation, syntax errors, random word insertions, spelling mistakes, and incomplete sentences may be an occasional consequence of this system secondary to software limitations with voice recognition, ambient noise, and hardware issues. If you have any questions or concerns about the content, text, or information contained within the body of this dictation, please contact the provider for clarification.

## 2024-05-04 NOTE — NURSING NOTE
Patient calm, cooperative, reading book in bed, and pleasant in conversation.    She denies  depression, anxiety, SI/HI/AVH and is able to make needs known.

## 2024-05-05 PROCEDURE — 99232 SBSQ HOSP IP/OBS MODERATE 35: CPT | Performed by: STUDENT IN AN ORGANIZED HEALTH CARE EDUCATION/TRAINING PROGRAM

## 2024-05-05 RX ADMIN — NICOTINE POLACRILEX 2 MG: 2 GUM, CHEWING ORAL at 08:47

## 2024-05-05 RX ADMIN — MULTIPLE VITAMINS W/ MINERALS TAB 1 TABLET: TAB ORAL at 08:44

## 2024-05-05 RX ADMIN — MELATONIN TAB 3 MG 3 MG: 3 TAB at 21:01

## 2024-05-05 RX ADMIN — ATORVASTATIN CALCIUM 10 MG: 10 TABLET, FILM COATED ORAL at 16:51

## 2024-05-05 RX ADMIN — TRAZODONE HYDROCHLORIDE 50 MG: 50 TABLET ORAL at 21:01

## 2024-05-05 RX ADMIN — ARIPIPRAZOLE 2 MG: 2 TABLET ORAL at 08:44

## 2024-05-05 RX ADMIN — NICOTINE POLACRILEX 2 MG: 2 GUM, CHEWING ORAL at 13:44

## 2024-05-05 RX ADMIN — NALTREXONE HYDROCHLORIDE 50 MG: 50 TABLET, FILM COATED ORAL at 08:44

## 2024-05-05 RX ADMIN — FOLIC ACID 1 MG: 1 TABLET ORAL at 08:43

## 2024-05-05 RX ADMIN — NICOTINE POLACRILEX 2 MG: 2 GUM, CHEWING ORAL at 19:52

## 2024-05-05 RX ADMIN — CYANOCOBALAMIN TAB 1000 MCG 1000 MCG: 1000 TAB at 08:43

## 2024-05-05 RX ADMIN — ESCITALOPRAM OXALATE 20 MG: 10 TABLET ORAL at 08:43

## 2024-05-05 RX ADMIN — THIAMINE HCL TAB 100 MG 100 MG: 100 TAB at 08:44

## 2024-05-05 RX ADMIN — NICOTINE 14 MG: 14 PATCH, EXTENDED RELEASE TRANSDERMAL at 08:47

## 2024-05-05 RX ADMIN — AMLODIPINE BESYLATE 5 MG: 5 TABLET ORAL at 08:44

## 2024-05-05 NOTE — NURSING NOTE
Pt calm and cooperative. Present in milieu watching television with peers. Appetite good. Medication compliant. CIWA 1600 and 2000 at 0. Denies SI/HI. Q 7 minute checks maintained for safety.

## 2024-05-05 NOTE — PROGRESS NOTES
Progress Note - Behavioral Health   Christy Borjas 63 y.o. female MRN: [id number]  Unit/Bed#: [facility] 646-02 Encounter: [id number]    Patient was visited on unit for continuing care; chart reviewed and discussed with the nursing staff.  On approach, the patient was calm and cooperative. Endorsed better mood and less anxiety sxs, but continues to have negative thinking and ruminating thoughts. No problem initiating and maintaining sleep.  Denied A/VH currently.  Denied active SI/HI, intent or plan upon direct inquiry at this time. The patient consented for safety and agreed to verbalize any frustration or concerns to the nursing staff, immediately.    Patient continues to be selectively interactive with staff and peers. No reports of aggression or self-injurious behavior on unit. No PRN medications used in the past 24 hours.    Patient accepted all offered medications and reported feeling better. No adverse effects of medications noted or reported. The patient was restarted on Lexapro 20 mg p.o. daily.  Naltrexone increased to 50 mg p.o. daily for alcohol abuse and started on Abilify 2 mg p.o. daily on 5/4/24 as the adjunct treatment for depression. The patient will benefit from referral to dual diagnosis services individual psychotherapy upon further psychiatric stabilization.      Current Mental Status Evaluation:  Appearance and attitude: appeared as stated age, casually dressed, with good hygiene  Eye contact: good  Motor Function: within normal limits, intact gait, No PMA/PMR  Gait/station: normal gait/station and normal balance  Speech: normal for rate, rhythm, volume, latency, amount  Language: No overt abnormality  Mood/affect: less anxious, less depressed / Affect was constricted but reactive, mood congruent, brighter  Thought Processes: linear  Thought content: denies suicidal ideation or homicidal ideation; no delusions or first rank symptoms, negative thinking, ruminations  Associations: intact  associations  Perceptual disturbances: denies Auditory/Visual/Tactile Hallucinations  Orientation: oriented to time, person, place and to the situational context  Cognitive Function: intact  Memory: recent and remote memory grossly intact  Intellect: average  Fund of knowledge: aware of current events, aware of past history, and vocabulary average  Impulse control: good  Insight/judgment: fair/fair    Vital signs in last 24 hours:    Temp:  [97.5 °F (36.4 °C)-98.1 °F (36.7 °C)] 97.6 °F (36.4 °C)  HR:  [74-82] 82  Resp:  [16-18] 18  BP: (132-148)/(71-90) 132/71    Laboratory results: I have personally reviewed all pertinent laboratory/tests results    Results from the past 24 hours: No results found for this or any previous visit (from the past 24 hour(s)).    Progress Toward Goals: continues to improve    Assessment:  Principal Problem:    Severe episode of recurrent major depressive disorder, without psychotic features (HCC)  Active Problems:    Alcohol use disorder    Medical clearance for psychiatric admission    Tobacco use    Dyslipidemia    Hypertension        Plan:  - f/u [facility] recs regarding the medical problems   - Continue medication titration and treatment plan; adjust medication to optimize treatment response and as clinically indicated.       Scheduled medications:  Current Facility-Administered Medications   Medication Dose Route Frequency Provider Last Rate    acetaminophen  650 mg Oral Q6H PRN Sasha Marie MD      acetaminophen  650 mg Oral Q4H PRN Sasha Marie MD      acetaminophen  975 mg Oral Q6H PRN Sasha Marie MD      aluminum-magnesium hydroxide-simethicone  30 mL Oral Q4H PRN Sasha Marie MD      amLODIPine  5 mg Oral Daily SHILOH Bruno      ARIPiprazole  2 mg Oral Daily Levar Lira MD      atorvastatin  10 mg Oral Daily With Dinner SHILOH Bruno      haloperidol lactate  2.5 mg Intramuscular Q4H PRN Max 4/day Sasha Marie MD      And     LORazepam  1 mg Intramuscular Q4H PRN Max 4/day Sasha Marie MD      And    benztropine  0.5 mg Intramuscular Q4H PRN Max 4/day Sasha Marie MD      haloperidol lactate  5 mg Intramuscular Q4H PRN Max 4/day Sasha Marie MD      And    LORazepam  2 mg Intramuscular Q4H PRN Max 4/day Sasha Marie MD      And    benztropine  1 mg Intramuscular Q4H PRN Max 4/day Sasha Marie MD      benztropine  1 mg Oral Q4H PRN Max 6/day Sasha Marie MD      bisacodyl  10 mg Rectal Daily PRN Sasha Marie MD      cyanocobalamin  1,000 mcg Oral Daily SHILOH Bruno      hydrOXYzine HCL  50 mg Oral Q6H PRN Max 4/day Sasha Marie MD      Or    diphenhydrAMINE  50 mg Intramuscular Q6H PRN Sasha Marie MD      ergocalciferol  50,000 Units Oral Weekly SHILOH Bruno      escitalopram  20 mg Oral Daily Levar Lira MD      folic acid  1 mg Oral Daily Levar Lira MD      haloperidol  1 mg Oral Q6H PRN Sasha Marie MD      haloperidol  2.5 mg Oral Q4H PRN Max 4/day Sasha Marie MD      haloperidol  5 mg Oral Q4H PRN Max 4/day Sasha Marie MD      hydrOXYzine HCL  100 mg Oral Q6H PRN Max 4/day Sasha Marie MD      Or    LORazepam  2 mg Intramuscular Q6H PRN Sasha Marie MD      hydrOXYzine HCL  25 mg Oral Q6H PRN Max 4/day Sasah Marie MD      LORazepam  1 mg Oral Q8H PRN Levar Lira MD      Or    LORazepam  1 mg Intramuscular Q8H PRN Levar Lira MD      melatonin  3 mg Oral HS Sasha Marie MD      multivitamin-minerals  1 tablet Oral Daily Levar iLra MD      naltrexone  50 mg Oral Daily Levar Lira MD      nicotine  14 mg Transdermal Daily SHILOH Bruno      nicotine polacrilex  2 mg Oral Q2H PRN SHILOH Bruno      ondansetron  4 mg Oral Q8H PRN SHILOH Bruno      polyethylene glycol  17 g Oral Daily PRN MD lucrecia Johnson-docusate sodium  1 tablet Oral Daily  PRN Sasha Marie MD      Thiamine Mononitrate  100 mg Oral Daily Levar Lira MD      traZODone  50 mg Oral HS PRN Sasha Marie MD          PRN:    acetaminophen    acetaminophen    acetaminophen    aluminum-magnesium hydroxide-simethicone    haloperidol lactate **AND** LORazepam **AND** benztropine    haloperidol lactate **AND** LORazepam **AND** benztropine    benztropine    bisacodyl    hydrOXYzine HCL **OR** diphenhydrAMINE    haloperidol    haloperidol    haloperidol    hydrOXYzine HCL **OR** LORazepam    hydrOXYzine HCL    LORazepam **OR** LORazepam    nicotine polacrilex    ondansetron    polyethylene glycol    senna-docusate sodium    traZODone    - Observation: routine            - VS: as per unit protocol  - Diet: Regular diet     - Psychoeducation (benefits and potential risks) discussed, importance of compliance with the psychiatric treatment reiterated, and the patient verbalized understanding of the matter  - Encourage group attendance and milieu therapy     - Dispo: To be determined         Next of Kin  Extended Emergency Contact Information  Primary Emergency Contact: Irais Moncada  Mobile Phone: [phone]  Relation: Sister      Counseling / Coordination of Care    Patient's progress reviewed with nursing staff.  Medications, treatment progress and treatment plan reviewed with patient.  Medication education provided to patient.  Reassurance and supportive therapy provided.  Reoriented to reality and reassured.  Encouraged participation in milieu and group therapy on the unit.     Levar Lira MD  Attending Psychiatrist   [facility]         This note was completed in part utilizing Dragon dictation Software. Grammatical, translation, syntax errors, random word insertions, spelling mistakes, and incomplete sentences may be an occasional consequence of this system secondary to software limitations with voice recognition, ambient noise, and hardware issues. If  you have any questions or concerns about the content, text, or information contained within the body of this dictation, please contact the provider for clarification.

## 2024-05-05 NOTE — PLAN OF CARE
Problem: Alteration in Thoughts and Perception  Goal: Treatment Goal: Gain control of psychotic behaviors/thinking, reduce/eliminate presenting symptoms and demonstrate improved reality functioning upon discharge  Outcome: Progressing  Goal: Verbalize thoughts and feelings  Description: Interventions:  - Promote a nonjudgmental and trusting relationship with the patient through active listening and therapeutic communication  - Assess patient's level of functioning, behavior and potential for risk  - Engage patient in 1 on 1 interactions  - Encourage patient to express fears, feelings, frustrations, and discuss symptoms    - [place] patient to reality, help patient recognize reality-based thinking   - Administer medications as ordered and assess for potential side effects  - Provide the patient education related to the signs and symptoms of the illness and desired effects of prescribed medications  Outcome: Progressing  Goal: Refrain from acting on delusional thinking/internal stimuli  Description: Interventions:  - Monitor patient closely, per order   - Utilize least restrictive measures   - Set reasonable limits, give positive feedback for acceptable   - Administer medications as ordered and monitor of potential side effects  Outcome: Progressing  Goal: Agree to be compliant with medication regime, as prescribed and report medication side effects  Description: Interventions:  - Offer appropriate PRN medication and supervise ingestion; conduct AIMS, as needed   Outcome: Progressing  Goal: Recognize dysfunctional thoughts, communicate reality-based thoughts at the time of discharge  Description: Interventions:  - Provide medication and psycho-education to assist patient in compliance and developing insight into his/her illness   Outcome: Progressing  Goal: Complete daily ADLs, including personal hygiene independently, as able  Description: Interventions:  - Observe, teach, and assist patient with ADLS  - Monitor and  promote a balance of rest/activity, with adequate nutrition and elimination   Outcome: Progressing     Problem: Ineffective Coping  Goal: Cooperates with admission process  Description: Interventions:   - Complete admission process  Outcome: Progressing  Goal: Identifies ineffective coping skills  Outcome: Progressing  Goal: Identifies healthy coping skills  Outcome: Progressing  Goal: Demonstrates healthy coping skills  Outcome: Progressing  Goal: Participates in unit activities  Description: Interventions:  - Provide therapeutic environment   - Provide required programming   - Redirect inappropriate behaviors   Outcome: Progressing  Goal: Patient/Family participate in treatment and DC plans  Description: Interventions:  - Provide therapeutic environment  Outcome: Progressing  Goal: Patient/Family verbalizes awareness of resources  Outcome: Progressing  Goal: Understands least restrictive measures  Description: Interventions:  - Utilize least restrictive behavior  Outcome: Progressing  Goal: Free from restraint events  Description: - Utilize least restrictive measures   - Provide behavioral interventions   - Redirect inappropriate behaviors   Outcome: Progressing     Problem: Risk for Self Injury/Neglect  Goal: Treatment Goal: Remain safe during length of stay, learn and adopt new coping skills, and be free of self-injurious ideation, impulses and acts at the time of discharge  Outcome: Progressing  Goal: Verbalize thoughts and feelings  Description: Interventions:  - Assess and re-assess patient's lethality and potential for self-injury  - Engage patient in 1:1 interactions, daily, for a minimum of 15 minutes  - Encourage patient to express feelings, fears, frustrations, hopes  - Establish rapport/trust with patient   Outcome: Progressing  Goal: Refrain from harming self  Description: Interventions:  - Monitor patient closely, per order  - Develop a trusting relationship  - Supervise medication ingestion, monitor  effects and side effects   Outcome: Progressing  Goal: Recognize maladaptive responses and adopt new coping mechanisms  Outcome: Progressing  Goal: Complete daily ADLs, including personal hygiene independently, as able  Description: Interventions:  - Observe, teach, and assist patient with ADLS  - Monitor and promote a balance of rest/activity, with adequate nutrition and elimination  Outcome: Progressing     Problem: Depression  Goal: Treatment Goal: Demonstrate behavioral control of depressive symptoms, verbalize feelings of improved mood/affect, and adopt new coping skills prior to discharge  Outcome: Progressing  Goal: Verbalize thoughts and feelings  Description: Interventions:  - Assess and re-assess patient's level of risk   - Engage patient in 1:1 interactions, daily, for a minimum of 15 minutes   - Encourage patient to express feelings, fears, frustrations, hopes   Outcome: Progressing  Goal: Refrain from harming self  Description: Interventions:  - Monitor patient closely, per order   - Supervise medication ingestion, monitor effects and side effects   Outcome: Progressing  Goal: Refrain from isolation  Description: Interventions:  - Develop a trusting relationship   - Encourage socialization   Outcome: Progressing  Goal: Refrain from self-neglect  Outcome: Progressing  Goal: Complete daily ADLs, including personal hygiene independently, as able  Description: Interventions:  - Observe, teach, and assist patient with ADLS  -  Monitor and promote a balance of rest/activity, with adequate nutrition and elimination   Outcome: Progressing     Problem: Anxiety  Goal: Anxiety is at manageable level  Description: Interventions:  - Assess and monitor patient's anxiety level.   - Monitor for signs and symptoms (heart palpitations, chest pain, shortness of breath, headaches, nausea, feeling jumpy, restlessness, irritable, apprehensive).   - Collaborate with interdisciplinary team and initiate plan and interventions  as ordered.  - [place] patient to unit/surroundings  - Explain treatment plan  - Encourage participation in care  - Encourage verbalization of concerns/fears  - Identify coping mechanisms  - Assist in developing anxiety-reducing skills  - Administer/offer alternative therapies  - Limit or eliminate stimulants  Outcome: Progressing     Problem: Alteration in Orientation  Goal: Treatment Goal: Demonstrate a reduction of confusion and improved orientation to person, place, time and/or situation upon discharge, according to optimum baseline/ability  Outcome: Progressing  Goal: Interact with staff daily  Description: Interventions:  - Assess and re-assess patient's level of orientation  - Engage patient in 1 on 1 interactions, daily, for a minimum of 15 minutes   - Establish rapport/trust with patient   Outcome: Progressing     Problem: DISCHARGE PLANNING - CARE MANAGEMENT  Goal: Discharge to post-acute care or home with appropriate resources  Description: INTERVENTIONS:  - Conduct assessment to determine patient/family and health care team treatment goals, and need for post-acute services based on payer coverage, community resources, and patient preferences, and barriers to discharge  - Address psychosocial, clinical, and financial barriers to discharge as identified in assessment in conjunction with the patient/family and health care team  - Arrange appropriate level of post-acute services according to patient’s   needs and preference and payer coverage in collaboration with the physician and health care team  - Communicate with and update the patient/family, physician, and health care team regarding progress on the discharge plan  - Arrange appropriate transportation to post-acute venues  Outcome: Progressing

## 2024-05-06 PROCEDURE — 99232 SBSQ HOSP IP/OBS MODERATE 35: CPT | Performed by: STUDENT IN AN ORGANIZED HEALTH CARE EDUCATION/TRAINING PROGRAM

## 2024-05-06 RX ORDER — ARIPIPRAZOLE 5 MG/1
5 TABLET ORAL DAILY
Status: DISCONTINUED | OUTPATIENT
Start: 2024-05-07 | End: 2024-05-10

## 2024-05-06 RX ORDER — MIRTAZAPINE 7.5 MG/1
7.5 TABLET, FILM COATED ORAL
Status: DISCONTINUED | OUTPATIENT
Start: 2024-05-06 | End: 2024-05-16 | Stop reason: HOSPADM

## 2024-05-06 RX ORDER — TRAZODONE HYDROCHLORIDE 50 MG/1
50 TABLET ORAL
Status: DISCONTINUED | OUTPATIENT
Start: 2024-05-06 | End: 2024-05-16 | Stop reason: HOSPADM

## 2024-05-06 RX ADMIN — ESCITALOPRAM OXALATE 20 MG: 10 TABLET ORAL at 08:10

## 2024-05-06 RX ADMIN — MELATONIN TAB 3 MG 3 MG: 3 TAB at 21:04

## 2024-05-06 RX ADMIN — NICOTINE POLACRILEX 2 MG: 2 GUM, CHEWING ORAL at 14:21

## 2024-05-06 RX ADMIN — CYANOCOBALAMIN TAB 1000 MCG 1000 MCG: 1000 TAB at 08:10

## 2024-05-06 RX ADMIN — NICOTINE 14 MG: 14 PATCH, EXTENDED RELEASE TRANSDERMAL at 08:11

## 2024-05-06 RX ADMIN — NICOTINE POLACRILEX 2 MG: 2 GUM, CHEWING ORAL at 17:05

## 2024-05-06 RX ADMIN — AMLODIPINE BESYLATE 5 MG: 5 TABLET ORAL at 08:10

## 2024-05-06 RX ADMIN — THIAMINE HCL TAB 100 MG 100 MG: 100 TAB at 08:10

## 2024-05-06 RX ADMIN — ATORVASTATIN CALCIUM 10 MG: 10 TABLET, FILM COATED ORAL at 16:21

## 2024-05-06 RX ADMIN — TRAZODONE HYDROCHLORIDE 50 MG: 50 TABLET ORAL at 21:05

## 2024-05-06 RX ADMIN — FOLIC ACID 1 MG: 1 TABLET ORAL at 08:10

## 2024-05-06 RX ADMIN — NICOTINE POLACRILEX 2 MG: 2 GUM, CHEWING ORAL at 08:20

## 2024-05-06 RX ADMIN — MULTIPLE VITAMINS W/ MINERALS TAB 1 TABLET: TAB ORAL at 08:10

## 2024-05-06 RX ADMIN — HYDROXYZINE HYDROCHLORIDE 100 MG: 50 TABLET, FILM COATED ORAL at 10:32

## 2024-05-06 RX ADMIN — ARIPIPRAZOLE 2 MG: 2 TABLET ORAL at 08:10

## 2024-05-06 RX ADMIN — NICOTINE POLACRILEX 2 MG: 2 GUM, CHEWING ORAL at 20:01

## 2024-05-06 RX ADMIN — NALTREXONE HYDROCHLORIDE 50 MG: 50 TABLET, FILM COATED ORAL at 08:10

## 2024-05-06 NOTE — NURSING NOTE
Patient pleasant and cooperative with care, medication complaint, able to make needs known. Visible in milieu, social with select peers. Endorses anxiety, denies depression, denies SI/HI,AH/VH, safety precautions maintained.

## 2024-05-06 NOTE — PLAN OF CARE
Problem: Alteration in Thoughts and Perception  Goal: Treatment Goal: Gain control of psychotic behaviors/thinking, reduce/eliminate presenting symptoms and demonstrate improved reality functioning upon discharge  Outcome: Progressing  Goal: Verbalize thoughts and feelings  Description: Interventions:  - Promote a nonjudgmental and trusting relationship with the patient through active listening and therapeutic communication  - Assess patient's level of functioning, behavior and potential for risk  - Engage patient in 1 on 1 interactions  - Encourage patient to express fears, feelings, frustrations, and discuss symptoms    - [place] patient to reality, help patient recognize reality-based thinking   - Administer medications as ordered and assess for potential side effects  - Provide the patient education related to the signs and symptoms of the illness and desired effects of prescribed medications  Outcome: Progressing  Goal: Refrain from acting on delusional thinking/internal stimuli  Description: Interventions:  - Monitor patient closely, per order   - Utilize least restrictive measures   - Set reasonable limits, give positive feedback for acceptable   - Administer medications as ordered and monitor of potential side effects  Outcome: Progressing  Goal: Agree to be compliant with medication regime, as prescribed and report medication side effects  Description: Interventions:  - Offer appropriate PRN medication and supervise ingestion; conduct AIMS, as needed   Outcome: Progressing  Goal: Recognize dysfunctional thoughts, communicate reality-based thoughts at the time of discharge  Description: Interventions:  - Provide medication and psycho-education to assist patient in compliance and developing insight into his/her illness   Outcome: Progressing  Goal: Complete daily ADLs, including personal hygiene independently, as able  Description: Interventions:  - Observe, teach, and assist patient with ADLS  - Monitor and  promote a balance of rest/activity, with adequate nutrition and elimination   Outcome: Progressing     Problem: Risk for Self Injury/Neglect  Goal: Treatment Goal: Remain safe during length of stay, learn and adopt new coping skills, and be free of self-injurious ideation, impulses and acts at the time of discharge  Outcome: Progressing  Goal: Verbalize thoughts and feelings  Description: Interventions:  - Assess and re-assess patient's lethality and potential for self-injury  - Engage patient in 1:1 interactions, daily, for a minimum of 15 minutes  - Encourage patient to express feelings, fears, frustrations, hopes  - Establish rapport/trust with patient   Outcome: Progressing  Goal: Refrain from harming self  Description: Interventions:  - Monitor patient closely, per order  - Develop a trusting relationship  - Supervise medication ingestion, monitor effects and side effects   Outcome: Progressing  Goal: Attend and participate in unit activities, including therapeutic, recreational, and educational groups  Description: Interventions:  - Provide therapeutic and educational activities daily, encourage attendance and participation, and document same in the medical record  - Obtain collateral information, encourage visitation and family involvement in care   Outcome: Progressing  Goal: Recognize maladaptive responses and adopt new coping mechanisms  Outcome: Progressing  Goal: Complete daily ADLs, including personal hygiene independently, as able  Description: Interventions:  - Observe, teach, and assist patient with ADLS  - Monitor and promote a balance of rest/activity, with adequate nutrition and elimination  Outcome: Progressing     Problem: DISCHARGE PLANNING - CARE MANAGEMENT  Goal: Discharge to post-acute care or home with appropriate resources  Description: INTERVENTIONS:  - Conduct assessment to determine patient/family and health care team treatment goals, and need for post-acute services based on payer  coverage, community resources, and patient preferences, and barriers to discharge  - Address psychosocial, clinical, and financial barriers to discharge as identified in assessment in conjunction with the patient/family and health care team  - Arrange appropriate level of post-acute services according to patient’s   needs and preference and payer coverage in collaboration with the physician and health care team  - Communicate with and update the patient/family, physician, and health care team regarding progress on the discharge plan  - Arrange appropriate transportation to post-acute venues  Outcome: Progressing

## 2024-05-06 NOTE — NURSING NOTE
Pleasant and cooperative. Medication and meal compliant. Denies SI/HI Q 7 minute checks maintained.

## 2024-05-06 NOTE — PROGRESS NOTES
05/06/24 0833   Team Meeting   Meeting Type Daily Rounds   Initial Conference Date 05/06/24   Next Conference Date 05/07/24   Team Members Present   Team Members Present Physician;Nurse;;   Physician Team Member Dr Lira, TOREY Connor, Dr Dempsey   Nursing Team Member Rahda Dunn   Care Management Team Member Madai   Social Work Team Member Alison   Other (Discipline and Name) Iliana - pharmacy   Patient/Family Present   Patient Present No   Patient's Family Present No     Trazodone 2100, CIWA 0, suicidal thoughts to drink self to death, calm, quiet, discharge pending.

## 2024-05-06 NOTE — PLAN OF CARE
Pt attends group and  participates.     Problem: Ineffective Coping  Goal: Participates in unit activities  Description: Interventions:  - Provide therapeutic environment   - Provide required programming   - Redirect inappropriate behaviors   Outcome: Progressing

## 2024-05-06 NOTE — TREATMENT TEAM
05/06/24 1032   Fisher Anxiety Scale   Anxious Mood 4   Tension 4   Fears 4   Insomnia 2   Intellectual 4   Depressed Mood 2   Somatic Complaints: Muscular 1   Somatic Complaints: Sensory 2   Cardiovascular Symptoms 0   Respiratory Symptoms 0   Gastrointestinal Symptoms 0   Genitourinary Symptoms 0   Autonomic Symptoms 3   Behavior at Interview 4   Fisher Anxiety Score 30     Patient noted to be anxious, ordered meds for anxiety per provider, Atarax 100 mg PO administered for severe anxiety.

## 2024-05-06 NOTE — TREATMENT TEAM
"Pt completed admission self assessment.  (See copy in chart)  Pt signed.  Pt biggest stressor leading to admission \" lots of things building up\"  Pt was not able to idenitfy a goal while she is here.  Encouraged pt to speak with dr and make needs known.  Reviewed group schedule and encouraged attendance when able.      05/06/24 1100   Activity/Group Checklist   Group Admission/Discharge   Attendance Attended   Attendance Duration (min) 16-30   Interactions Interacted appropriately   Affect/Mood Blunted/flat;Appropriate   Goals Achieved Identified feelings;Identified triggers;Able to engage in interactions;Able to listen to others;Able to manage/cope with feelings;Able to self-disclose;Able to recieve feedback       "

## 2024-05-06 NOTE — PROGRESS NOTES
Progress Note - Behavioral Health   Christy Borjas 63 y.o. female MRN: [id number]  Unit/Bed#: [facility] 646-02 Encounter: [id number]       Patient was visited on unit for continuing care; chart reviewed and discussed with multidisciplinary treatment team.  On approach, the patient was calm and cooperative. Denied any changes in mood, appetite, and energy level. Remains preoccupied with her psychosocial stressors, and reported ruminating thoughts at night, concerned about losing her job, her housing situation and not being able to drive as her sister would not accept her without considering treatment for alcohol abuse and would not let her to drive her car (reportedly her own car is broke). She noted that she is not willing to go to the , and does not like to be labeled as alcoholic. She appeared to be in precontemplation stage, and not ready for sobriety. Later, the patient became tearful, reached out to the writer, and noted that she has passing thoughts of death as she feels hopeless and helpless. Supportive psychotherapy provided and patient's emotions were validated. Reported initial insomnia. Denied A/VH currently.  Continues to report intrusive thoughts and negative ruminations. Denied active SI/HI, intent or plan upon direct inquiry at this time. The patient consented for safety and agreed to verbalize any frustration or concerns to the nursing staff, immediately.    Patient continues to be intermittently visible in the milieu and interacts with select staff and peers. No reports of aggression or self-injurious behavior on unit.  Received trazodone 50 mg as needed at 2101 for insomnia and Atarax 100 mg at 10:32 AM this morning for anxiety.    Patient accepted all offered medications and no adverse effects of medications noted or reported. Abilify is being uptitrated to 5 mg daily as the adjunct treatment for depression and was started on Trazodone 50 mg nightly for insomnia; doses to be adjusted as indicated.  The patient benefits from referral to dual diagnosis services upon further psychiatric stabilization.      Current Mental Status Evaluation:  Appearance and attitude: appeared as stated age, dressed in hospital attire, with good hygiene  Eye contact: good  Motor Function: within normal limits, intact gait, No PMA/PMR  Gait/station: normal gait/station and normal balance  Speech: normal for rate, rhythm, volume, latency, amount  Language: No overt abnormality  Mood/affect: depressed, anxious / Affect was constricted but reactive, mood congruent, tearful  Thought Processes: ruminating  Thought content: denied suicidal ideations or homicidal ideations, no overt delusions elicited, negative thinking, intrusive thoughts, ruminating thoughts  Associations: intact associations  Perceptual disturbances: denies Auditory/Visual/Tactile Hallucinations  Orientation: oriented to time, person, place and to the situational context  Cognitive Function: intact  Memory: recent and remote memory grossly intact  Intellect: average  Fund of knowledge: aware of current events, aware of past history, and vocabulary average  Impulse control: good  Insight/judgment: fair/fair    Pain: denied  Pain scale: 0      Vital signs in last 24 hours:    Temp:  [97.1 °F (36.2 °C)-97.5 °F (36.4 °C)] 97.5 °F (36.4 °C)  HR:  [63-85] 67  Resp:  [16-20] 20  BP: (134-168)/(70-90) 137/78    Laboratory results: I have personally reviewed all pertinent laboratory/tests results    Results from the past 24 hours: No results found for this or any previous visit (from the past 24 hour(s)).    Progress Toward Goals: minimal improvement    Assessment:  Principal Problem:    Severe episode of recurrent major depressive disorder, without psychotic features (HCC)  Active Problems:    Alcohol use disorder    Medical clearance for psychiatric admission    Tobacco use    Dyslipidemia    Hypertension        Plan:  - f/u SLIM recs regarding the medical problems   - Continue  medication titration and treatment plan; adjust medication to optimize treatment response and as clinically indicated. .     Scheduled medications:  Current Facility-Administered Medications   Medication Dose Route Frequency Provider Last Rate    acetaminophen  650 mg Oral Q6H PRN Sasha Marie MD      acetaminophen  650 mg Oral Q4H PRN Sasha Marie MD      acetaminophen  975 mg Oral Q6H PRN Sasha Marie MD      aluminum-magnesium hydroxide-simethicone  30 mL Oral Q4H PRN Sasha Marie MD      amLODIPine  5 mg Oral Daily SHILOH Bruno      [START ON 5/7/2024] ARIPiprazole  5 mg Oral Daily Levar Lira MD      atorvastatin  10 mg Oral Daily With Dinner SHILOH Bruno      haloperidol lactate  2.5 mg Intramuscular Q4H PRN Max 4/day Sasha Marie MD      And    LORazepam  1 mg Intramuscular Q4H PRN Max 4/day Sasha Marie MD      And    benztropine  0.5 mg Intramuscular Q4H PRN Max 4/day Sasha Marie MD      haloperidol lactate  5 mg Intramuscular Q4H PRN Max 4/day Sasha Marie MD      And    LORazepam  2 mg Intramuscular Q4H PRN Max 4/day Sasha Marie MD      And    benztropine  1 mg Intramuscular Q4H PRN Max 4/day Sasha Marie MD      benztropine  1 mg Oral Q4H PRN Max 6/day Sasha Marie MD      bisacodyl  10 mg Rectal Daily PRN Sasha Marie MD      cyanocobalamin  1,000 mcg Oral Daily SHILOH Bruno      hydrOXYzine HCL  50 mg Oral Q6H PRN Max 4/day Sasha Marie MD      Or    diphenhydrAMINE  50 mg Intramuscular Q6H PRN Sasha Marie MD      ergocalciferol  50,000 Units Oral Weekly SHILOH Bruno      escitalopram  20 mg Oral Daily Levar Lira MD      folic acid  1 mg Oral Daily Levar Lira MD      haloperidol  1 mg Oral Q6H PRN Sasha Marie MD      haloperidol  2.5 mg Oral Q4H PRN Max 4/day Sasha Marie MD      haloperidol  5 mg Oral Q4H PRN Max 4/day Sasha Marie MD       hydrOXYzine HCL  100 mg Oral Q6H PRN Max 4/day Sasha Marie MD      Or    LORazepam  2 mg Intramuscular Q6H PRN Sasha Marie MD      hydrOXYzine HCL  25 mg Oral Q6H PRN Max 4/day Sasha Marie MD      melatonin  3 mg Oral HS Sasha Marie MD      multivitamin-minerals  1 tablet Oral Daily Levar Lira MD      naltrexone  50 mg Oral Daily Levar Lira MD      nicotine  14 mg Transdermal Daily SHILOH Bruno      nicotine polacrilex  2 mg Oral Q2H PRN SHILOH Bruno      ondansetron  4 mg Oral Q8H PRN SHILOH Bruno      polyethylene glycol  17 g Oral Daily PRN Sasha Marie MD      senna-docusate sodium  1 tablet Oral Daily PRN Sasha Marei MD      Thiamine Mononitrate  100 mg Oral Daily Levar Lira MD      traZODone  50 mg Oral HS PRN Sasha Marie MD          PRN:    acetaminophen    acetaminophen    acetaminophen    aluminum-magnesium hydroxide-simethicone    haloperidol lactate **AND** LORazepam **AND** benztropine    haloperidol lactate **AND** LORazepam **AND** benztropine    benztropine    bisacodyl    hydrOXYzine HCL **OR** diphenhydrAMINE    haloperidol    haloperidol    haloperidol    hydrOXYzine HCL **OR** LORazepam    hydrOXYzine HCL    nicotine polacrilex    ondansetron    polyethylene glycol    senna-docusate sodium    traZODone    - Observation: routine            - VS: as per unit protocol  - Diet: Regular diet     - Psychoeducation (benefits and potential risks) discussed, importance of compliance with the psychiatric treatment reiterated, and the patient verbalized understanding of the matter  - Encourage group attendance and milieu therapy     - Dispo: To be determined       Next of Kin  Extended Emergency Contact Information  Primary Emergency Contact: AntwanIrais  Mobile Phone: [phone]  Relation: Sister      Counseling / Coordination of Care  Patient's progress discussed with staff in treatment team  meeting.  Medications, treatment progress and treatment plan reviewed with patient.  Medication changes discussed with patient.  Medication education provided to patient.  Supportive therapy provided to patient.  Cognitive techniques utilized during the session.  Reassurance and supportive therapy provided.  Reoriented to reality and reassured.  Encouraged participation in milieu and group therapy on the unit.  Crisis/safety plan discussed with patient.       Levar Lira MD  Attending Psychiatrist   [facility] [address]       This note was completed in part utilizing Dragon dictation Software. Grammatical, translation, syntax errors, random word insertions, spelling mistakes, and incomplete sentences may be an occasional consequence of this system secondary to software limitations with voice recognition, ambient noise, and hardware issues. If you have any questions or concerns about the content, text, or information contained within the body of this dictation, please contact the provider for clarification.

## 2024-05-06 NOTE — NURSING NOTE
"Patient is visible on the unit and social with peer. Patient is bright and pleasant during interactions with staff but is tearful when discussing discharge of peer on the unit and worrying about who her next roommate will be. Patient stated \"Can I move up to room 642? If I get a loud and disruptive roommate it's going to set me back. I like to lay in bed and read.\" Support and reassurance provided. Patient was compliant with medications. Reports mood is \"7/10.\" Encouraged to inform staff of any needs or concerns. Safety checks ongoing.   "

## 2024-05-06 NOTE — PROGRESS NOTES
Pt attended ALL groups.  Pt less tearful in afternoon and calmer as day progressed.      05/06/24 1330   Activity/Group Checklist   Group Other (Comment)  (journaling and mindfulness)   Attendance Attended   Attendance Duration (min) 46-60   Interactions Interacted appropriately   Affect/Mood Appropriate   Goals Achieved Identified feelings;Able to listen to others;Able to engage in interactions;Able to reflect/comment on own behavior;Able to manage/cope with feelings;Verbalized increased hopefulness;Discussed self-esteem issues;Discussed coping strategies

## 2024-05-07 PROCEDURE — 99232 SBSQ HOSP IP/OBS MODERATE 35: CPT | Performed by: STUDENT IN AN ORGANIZED HEALTH CARE EDUCATION/TRAINING PROGRAM

## 2024-05-07 RX ADMIN — NALTREXONE HYDROCHLORIDE 50 MG: 50 TABLET, FILM COATED ORAL at 08:25

## 2024-05-07 RX ADMIN — AMLODIPINE BESYLATE 5 MG: 5 TABLET ORAL at 08:25

## 2024-05-07 RX ADMIN — CYANOCOBALAMIN TAB 1000 MCG 1000 MCG: 1000 TAB at 08:26

## 2024-05-07 RX ADMIN — FOLIC ACID 1 MG: 1 TABLET ORAL at 08:26

## 2024-05-07 RX ADMIN — MELATONIN TAB 3 MG 3 MG: 3 TAB at 21:08

## 2024-05-07 RX ADMIN — THIAMINE HCL TAB 100 MG 100 MG: 100 TAB at 08:25

## 2024-05-07 RX ADMIN — NICOTINE POLACRILEX 2 MG: 2 GUM, CHEWING ORAL at 19:57

## 2024-05-07 RX ADMIN — ATORVASTATIN CALCIUM 10 MG: 10 TABLET, FILM COATED ORAL at 16:18

## 2024-05-07 RX ADMIN — TRAZODONE HYDROCHLORIDE 50 MG: 50 TABLET ORAL at 21:08

## 2024-05-07 RX ADMIN — MULTIPLE VITAMINS W/ MINERALS TAB 1 TABLET: TAB ORAL at 08:25

## 2024-05-07 RX ADMIN — ESCITALOPRAM OXALATE 20 MG: 10 TABLET ORAL at 08:25

## 2024-05-07 RX ADMIN — NICOTINE POLACRILEX 2 MG: 2 GUM, CHEWING ORAL at 17:07

## 2024-05-07 RX ADMIN — NICOTINE 14 MG: 14 PATCH, EXTENDED RELEASE TRANSDERMAL at 08:27

## 2024-05-07 RX ADMIN — ARIPIPRAZOLE 5 MG: 5 TABLET ORAL at 08:25

## 2024-05-07 RX ADMIN — NICOTINE POLACRILEX 2 MG: 2 GUM, CHEWING ORAL at 08:33

## 2024-05-07 RX ADMIN — NICOTINE POLACRILEX 2 MG: 2 GUM, CHEWING ORAL at 13:51

## 2024-05-07 NOTE — PROGRESS NOTES
05/07/24 0822   Team Meeting   Meeting Type Daily Rounds   Initial Conference Date 05/07/24   Next Conference Date 05/08/24   Team Members Present   Team Members Present Physician;Nurse;;   Physician Team Member Dr Lira, TOREY Connor   Nursing Team Member Radha Harrington   Care Management Team Member Madai   Social Work Team Member Alison   Patient/Family Present   Patient Present No   Patient's Family Present No     Atarax 100 1032 am, anxious and tearful, does not want her roommate to leave, slept, discharge pending monday possible.

## 2024-05-07 NOTE — TREATMENT TEAM
Pt completed  relapse prevention plan with assistance.  Pt signed and copy in chart.  Crisis, wamline and 988 numbers provided.    [facility].  Pt did not have a provider.  Pt attends groups.          05/07/24 0945   Activity/Group Checklist   Group Admission/Discharge   Attendance Attended   Attendance Duration (min) 16-30   Interactions Interacted appropriately   Affect/Mood Appropriate   Goals Achieved Identified feelings;Identified relapse prevention strategies;Discussed coping strategies;Able to listen to others;Able to engage in interactions;Able to manage/cope with feelings;Discussed self-esteem issues;Identified resources and support systems

## 2024-05-07 NOTE — PLAN OF CARE
Problem: Depression  Goal: Treatment Goal: Demonstrate behavioral control of depressive symptoms, verbalize feelings of improved mood/affect, and adopt new coping skills prior to discharge  Outcome: Progressing  Goal: Verbalize thoughts and feelings  Description: Interventions:  - Assess and re-assess patient's level of risk   - Engage patient in 1:1 interactions, daily, for a minimum of 15 minutes   - Encourage patient to express feelings, fears, frustrations, hopes   Outcome: Progressing  Goal: Refrain from harming self  Description: Interventions:  - Monitor patient closely, per order   - Supervise medication ingestion, monitor effects and side effects   Outcome: Progressing  Goal: Refrain from isolation  Description: Interventions:  - Develop a trusting relationship   - Encourage socialization   Outcome: Progressing  Goal: Refrain from self-neglect  Outcome: Progressing  Goal: Complete daily ADLs, including personal hygiene independently, as able  Description: Interventions:  - Observe, teach, and assist patient with ADLS  -  Monitor and promote a balance of rest/activity, with adequate nutrition and elimination   Outcome: Progressing     Problem: Anxiety  Goal: Anxiety is at manageable level  Description: Interventions:  - Assess and monitor patient's anxiety level.   - Monitor for signs and symptoms (heart palpitations, chest pain, shortness of breath, headaches, nausea, feeling jumpy, restlessness, irritable, apprehensive).   - Collaborate with interdisciplinary team and initiate plan and interventions as ordered.  - [place] patient to unit/surroundings  - Explain treatment plan  - Encourage participation in care  - Encourage verbalization of concerns/fears  - Identify coping mechanisms  - Assist in developing anxiety-reducing skills  - Administer/offer alternative therapies  - Limit or eliminate stimulants  Outcome: Progressing     Problem: Alteration in Orientation  Goal: Treatment Goal: Demonstrate a  reduction of confusion and improved orientation to person, place, time and/or situation upon discharge, according to optimum baseline/ability  Outcome: Progressing  Goal: Interact with staff daily  Description: Interventions:  - Assess and re-assess patient's level of orientation  - Engage patient in 1 on 1 interactions, daily, for a minimum of 15 minutes   - Establish rapport/trust with patient   Outcome: Progressing

## 2024-05-07 NOTE — NURSING NOTE
Patient is pleasant, calm and cooperative with care. Denies all psych s/s. Medication compliant. Patient is visible in the dayroom sociable with peers. Patient is able to make needs known. Safety checks ongoing.

## 2024-05-07 NOTE — PROGRESS NOTES
Progress Note - Behavioral Health   Christy Borjas 63 y.o. female MRN: [id number]  Unit/Bed#: [facility] 646-02 Encounter: [id number]       Patient was visited on unit for continuing care; chart reviewed and discussed with multidisciplinary treatment team.  On approach, the patient was calm and cooperative. Endorsed better mood but continues to have negative thinking and ruminating thoughts. The patient was initially bright and pleasant, but as started talking about her stressors, started crying and remained tearful for most of the interview. She reported feeling frustrated by psychosocial stressors, lack of stable income, housing and support, and feels that she is a burden to her sister and her personal life. She reported hopelessness, helpless and guilt and noted that she does not find any meaning in life or purpose to live or ask for help. The patient lost her job and feels concerned. Continues to report intrusive thoughts and negative ruminations. Denied active SI/HI, intent or plan upon direct inquiry at this time. The patient consented for safety and agreed to verbalize any frustration or concerns to the nursing staff, immediately. Denied any changes in her appetite or energy level and endorsed good sleep last night. Denied A/VH.    Patient continues to be visible in the milieu and interacts with staff and peers. No reports of aggression or self-injurious behavior on unit. No PRN medications used in the past 24 hours.    Patient accepted all offered medications and no adverse effects of medications noted or reported. Abilify is being uptitrated 5 mg daily as the adjunct treatment for depression and was started on Trazodone 50 mg nightly for insomnia on 5/6/24; doses to be adjusted as indicated. The patient benefits from referral to dual diagnosis services upon further psychiatric stabilization    Current Mental Status Evaluation:  Appearance and attitude: appeared as stated age, dressed in hospital attire, with  good hygiene  Eye contact: good  Motor Function: within normal limits, intact gait, No PMA/PMR  Gait/station: normal gait/station and normal balance  Speech: normal for rate, rhythm, volume, latency, amount  Language: No overt abnormality  Mood/affect: depressed / Affect was constricted but reactive, mood congruent, tearful  Thought Processes: negative thinking, ruminating  Thought content: denied suicidal ideations or homicidal ideations, no overt delusions elicited, negative thinking, intrusive thoughts, ruminations  Associations: intact associations  Perceptual disturbances: denies Auditory/Visual/Tactile Hallucinations  Orientation: oriented to time, person, place and to the situational context  Cognitive Function: intact  Memory: recent and remote memory grossly intact  Intellect: average  Fund of knowledge: aware of current events, aware of past history, and vocabulary average  Impulse control: good  Insight/judgment: fair/fair    Vital signs in last 24 hours:    Temp:  [97.3 °F (36.3 °C)-97.5 °F (36.4 °C)] 97.3 °F (36.3 °C)  HR:  [66-72] 72  Resp:  [19-20] 19  BP: (132-157)/(77-94) 132/77    Laboratory results: I have personally reviewed all pertinent laboratory/tests results    Results from the past 24 hours: No results found for this or any previous visit (from the past 24 hour(s)).    Progress Toward Goals: no significant improvement    Assessment:  Principal Problem:    Severe episode of recurrent major depressive disorder, without psychotic features (HCC)  Active Problems:    Alcohol use disorder    Medical clearance for psychiatric admission    Tobacco use    Dyslipidemia    Hypertension        Plan:  - f/u SLIM recs regarding the medical problems   - Continue medication titration and treatment plan; adjust medication to optimize treatment response and as clinically indicated. .     Scheduled medications:  Current Facility-Administered Medications   Medication Dose Route Frequency Provider Last Rate     acetaminophen  650 mg Oral Q6H PRN Sasha Marie MD      acetaminophen  650 mg Oral Q4H PRN Sasha Marie MD      acetaminophen  975 mg Oral Q6H PRN Sasha Marie MD      aluminum-magnesium hydroxide-simethicone  30 mL Oral Q4H PRN Sasha Marie MD      amLODIPine  5 mg Oral Daily Nellie Bondsluna, CRJOYCE      ARIPiprazole  5 mg Oral Daily Levar Lira MD      atorvastatin  10 mg Oral Daily With Dinner Nellie Matthewsrakesh, CRNP      haloperidol lactate  2.5 mg Intramuscular Q4H PRN Max 4/day Sasha Marie MD      And    LORazepam  1 mg Intramuscular Q4H PRN Max 4/day Sasha Marie MD      And    benztropine  0.5 mg Intramuscular Q4H PRN Max 4/day Sasha Marie MD      haloperidol lactate  5 mg Intramuscular Q4H PRN Max 4/day Sasha Marie MD      And    LORazepam  2 mg Intramuscular Q4H PRN Max 4/day Sasha Marie MD      And    benztropine  1 mg Intramuscular Q4H PRN Max 4/day Sasha Marie MD      benztropine  1 mg Oral Q4H PRN Max 6/day Sasha Marie MD      bisacodyl  10 mg Rectal Daily PRN Ssaha Marie MD      cyanocobalamin  1,000 mcg Oral Daily Nellie Bondsluna, CRNP      hydrOXYzine HCL  50 mg Oral Q6H PRN Max 4/day Sasha Marie MD      Or    diphenhydrAMINE  50 mg Intramuscular Q6H PRN Sasha Marie MD      ergocalciferol  50,000 Units Oral Weekly Nellie Matthewsrakesh, CRNP      escitalopram  20 mg Oral Daily Levar Lira MD      folic acid  1 mg Oral Daily Levar Lira MD      haloperidol  1 mg Oral Q6H PRN Sasha Marie MD      haloperidol  2.5 mg Oral Q4H PRN Max 4/day Sasha Marie MD      haloperidol  5 mg Oral Q4H PRN Max 4/day Sasha Marie MD      hydrOXYzine HCL  100 mg Oral Q6H PRN Max 4/day aSsha Marie MD      Or    LORazepam  2 mg Intramuscular Q6H PRN Sasha Marie MD      hydrOXYzine HCL  25 mg Oral Q6H PRN Max 4/day Sasha Marie MD      melatonin  3 mg Oral HS Sasha Marie MD       mirtazapine  7.5 mg Oral HS PRN Levar Lira MD      multivitamin-minerals  1 tablet Oral Daily Levar Lira MD      naltrexone  50 mg Oral Daily Levar Lira MD      nicotine  14 mg Transdermal Daily SHILOH Bruno      nicotine polacrilex  2 mg Oral Q2H PRN SHILOH Bruno      ondansetron  4 mg Oral Q8H PRN SHILOH Bruno      polyethylene glycol  17 g Oral Daily PRN Sasha Marie MD      senna-docusate sodium  1 tablet Oral Daily PRN Sasha Marie MD      Thiamine Mononitrate  100 mg Oral Daily Levar Lira MD      traZODone  50 mg Oral HS Levar Lira MD          PRN:    acetaminophen    acetaminophen    acetaminophen    aluminum-magnesium hydroxide-simethicone    haloperidol lactate **AND** LORazepam **AND** benztropine    haloperidol lactate **AND** LORazepam **AND** benztropine    benztropine    bisacodyl    hydrOXYzine HCL **OR** diphenhydrAMINE    haloperidol    haloperidol    haloperidol    hydrOXYzine HCL **OR** LORazepam    hydrOXYzine HCL    mirtazapine    nicotine polacrilex    ondansetron    polyethylene glycol    senna-docusate sodium    - Observation: routine            - VS: as per unit protocol  - Diet: Regular diet     - Psychoeducation (benefits and potential risks) discussed, importance of compliance with the psychiatric treatment reiterated, and the patient verbalized understanding of the matter  - Encourage group attendance and milieu therapy      - The pt was educated and agreed to verbalize any suicidal thoughts, frustrations or concerns to the nursing staff, immediately.   - Dispo: To be determined       Next of Kin  Extended Emergency Contact Information  Primary Emergency Contact: Irais Moncada  Mobile Phone: [phone]  Relation: Sister      Counseling / Coordination of Care  Patient's progress discussed with staff in treatment team meeting.  Medications, treatment progress and treatment plan reviewed with patient.  Medication  education provided to patient.  Supportive therapy provided to patient.  Cognitive techniques utilized during the session.  Reassurance and supportive therapy provided.  Reoriented to reality and reassured.  Encouraged participation in milieu and group therapy on the unit.  Crisis/safety plan discussed with patient.       Levar Lira MD  Attending Psychiatrist   [facility]       This note was completed in part utilizing Dragon dictation Software. Grammatical, translation, syntax errors, random word insertions, spelling mistakes, and incomplete sentences may be an occasional consequence of this system secondary to software limitations with voice recognition, ambient noise, and hardware issues. If you have any questions or concerns about the content, text, or information contained within the body of this dictation, please contact the provider for clarification.

## 2024-05-07 NOTE — PROGRESS NOTES
"Pt attended afternoon group.  Blunt affect and hopeless.  Pt indicated she does not utilize her supports because she feels like a \"[place] downer\".       05/07/24 1300   Activity/Group Checklist   Group Other (Comment)  (Coping skills: Healthy vs non healthy)   Attendance Attended   Attendance Duration (min) 46-60   Interactions Interacted appropriately   Affect/Mood Blunted/flat   Goals Achieved Identified feelings;Discussed coping strategies;Identified relapse prevention strategies;Displayed empathy;Able to reflect/comment on own behavior;Able to engage in interactions;Able to listen to others;Able to manage/cope with feelings;Verbalized increased hopefulness;Able to self-disclose;Able to recieve feedback         "

## 2024-05-07 NOTE — NURSING NOTE
Patient pleasant and cooperative with care, medication complaint, able to make needs known. Visible in milieu, social with select peers. Denied anxiety/depression, denies SI/HI,AH/VH, safety precautions maintained.

## 2024-05-08 LAB
ALBUMIN SERPL BCP-MCNC: 4.7 G/DL (ref 3.5–5)
ALP SERPL-CCNC: 57 U/L (ref 34–104)
ALT SERPL W P-5'-P-CCNC: 13 U/L (ref 7–52)
ANION GAP SERPL CALCULATED.3IONS-SCNC: 12 MMOL/L (ref 4–13)
AST SERPL W P-5'-P-CCNC: 25 U/L (ref 13–39)
BILIRUB SERPL-MCNC: 0.3 MG/DL (ref 0.2–1)
BUN SERPL-MCNC: 13 MG/DL (ref 5–25)
CALCIUM SERPL-MCNC: 9 MG/DL (ref 8.4–10.2)
CHLORIDE SERPL-SCNC: 107 MMOL/L (ref 96–108)
CO2 SERPL-SCNC: 27 MMOL/L (ref 21–32)
CREAT SERPL-MCNC: 0.49 MG/DL (ref 0.6–1.3)
GFR SERPL CREATININE-BSD FRML MDRD: 105 ML/MIN/1.73SQ M
GLUCOSE SERPL-MCNC: 83 MG/DL (ref 65–140)
POTASSIUM SERPL-SCNC: 3.7 MMOL/L (ref 3.5–5.3)
PROT SERPL-MCNC: 7.8 G/DL (ref 6.4–8.4)
SODIUM SERPL-SCNC: 146 MMOL/L (ref 135–147)

## 2024-05-08 PROCEDURE — 99232 SBSQ HOSP IP/OBS MODERATE 35: CPT | Performed by: STUDENT IN AN ORGANIZED HEALTH CARE EDUCATION/TRAINING PROGRAM

## 2024-05-08 RX ADMIN — CYANOCOBALAMIN TAB 1000 MCG 1000 MCG: 1000 TAB at 08:22

## 2024-05-08 RX ADMIN — TRAZODONE HYDROCHLORIDE 50 MG: 50 TABLET ORAL at 21:21

## 2024-05-08 RX ADMIN — ATORVASTATIN CALCIUM 10 MG: 10 TABLET, FILM COATED ORAL at 16:50

## 2024-05-08 RX ADMIN — ESCITALOPRAM OXALATE 20 MG: 10 TABLET ORAL at 08:21

## 2024-05-08 RX ADMIN — NICOTINE 14 MG: 14 PATCH, EXTENDED RELEASE TRANSDERMAL at 08:23

## 2024-05-08 RX ADMIN — MULTIPLE VITAMINS W/ MINERALS TAB 1 TABLET: TAB ORAL at 08:23

## 2024-05-08 RX ADMIN — NICOTINE POLACRILEX 2 MG: 2 GUM, CHEWING ORAL at 19:54

## 2024-05-08 RX ADMIN — NICOTINE POLACRILEX 2 MG: 2 GUM, CHEWING ORAL at 12:34

## 2024-05-08 RX ADMIN — THIAMINE HCL TAB 100 MG 100 MG: 100 TAB at 08:22

## 2024-05-08 RX ADMIN — ARIPIPRAZOLE 5 MG: 5 TABLET ORAL at 08:22

## 2024-05-08 RX ADMIN — MELATONIN TAB 3 MG 3 MG: 3 TAB at 21:21

## 2024-05-08 RX ADMIN — NICOTINE POLACRILEX 2 MG: 2 GUM, CHEWING ORAL at 16:50

## 2024-05-08 RX ADMIN — NALTREXONE HYDROCHLORIDE 50 MG: 50 TABLET, FILM COATED ORAL at 08:22

## 2024-05-08 RX ADMIN — NICOTINE POLACRILEX 2 MG: 2 GUM, CHEWING ORAL at 09:05

## 2024-05-08 RX ADMIN — FOLIC ACID 1 MG: 1 TABLET ORAL at 08:22

## 2024-05-08 RX ADMIN — AMLODIPINE BESYLATE 5 MG: 5 TABLET ORAL at 08:22

## 2024-05-08 NOTE — TREATMENT TEAM
Pt l;eft w/ doctor before engaging in discussion.  Pt did not attend other groups.    05/08/24 1000   Activity/Group Checklist   Group Community meeting   Attendance Attended;Other (Comment)  (left w )   Attendance Duration (min) 31-45   Interactions Interacted appropriately   Affect/Mood Blunted/flat   Goals Achieved Identified feelings;Identified relapse prevention strategies;Discussed self-esteem issues;Discussed coping strategies;Increased hopefulness;Identified distorted thoughts/beliefs;Able to engage in interactions;Able to reflect/comment on own behavior;Able to manage/cope with feelings;Verbalized increased hopefulness;Able to self-disclose;Able to recieve feedback

## 2024-05-08 NOTE — NURSING NOTE
Patient awake and withdrawn to room, Visible during meals and bright on approach. Denies anxiety, depression, AH/VH/SI/HI. Medication compliant and cooperative with care. Safety precautions maintained.

## 2024-05-08 NOTE — PROGRESS NOTES
05/08/24 0819   Team Meeting   Meeting Type Daily Rounds   Initial Conference Date 05/08/24   Next Conference Date 05/09/24   Team Members Present   Team Members Present Physician;Nurse;;   Physician Team Member Dr Lira, Dr Mariee, TOREY Persaud   Nursing Team Member Juany Garcia   Care Management Team Member Madai   Social Work Team Member Alison   Patient/Family Present   Patient Present No   Patient's Family Present No     Reports minimal anxiety, states she is feeling better, superficial, has no insurance, lost her job, discharge pending.
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
## 2024-05-08 NOTE — PROGRESS NOTES
05/08/24 1100   Activity/Group Checklist   Group Life Skills  (vitality)   Attendance Attended   Attendance Duration (min) 46-60   Interactions Other (Comment)  (Pt. focused to topic yet short tempered re: more confused patients.)   Affect/Mood Normal range   Goals Achieved Able to engage in interactions;Able to listen to others;Discussed coping strategies

## 2024-05-08 NOTE — NURSING NOTE
"Pt visible sitting in room reading independently so far this shift. Pt visible for snack and meals. Pt pleasant and bright on approach. Pt denied SI/HI/AVH. Pt states that she is here because of a \"suicide attempt\" but states she feels \"a lot better.\" Pt ambulates independently and safely on the unit. Pt able to and encouraged to inform staff of any needs.   "

## 2024-05-08 NOTE — PLAN OF CARE
Problem: Alteration in Thoughts and Perception  Goal: Treatment Goal: Gain control of psychotic behaviors/thinking, reduce/eliminate presenting symptoms and demonstrate improved reality functioning upon discharge  Outcome: Progressing  Goal: Verbalize thoughts and feelings  Description: Interventions:  - Promote a nonjudgmental and trusting relationship with the patient through active listening and therapeutic communication  - Assess patient's level of functioning, behavior and potential for risk  - Engage patient in 1 on 1 interactions  - Encourage patient to express fears, feelings, frustrations, and discuss symptoms    - [place] patient to reality, help patient recognize reality-based thinking   - Administer medications as ordered and assess for potential side effects  - Provide the patient education related to the signs and symptoms of the illness and desired effects of prescribed medications  Outcome: Progressing  Goal: Refrain from acting on delusional thinking/internal stimuli  Description: Interventions:  - Monitor patient closely, per order   - Utilize least restrictive measures   - Set reasonable limits, give positive feedback for acceptable   - Administer medications as ordered and monitor of potential side effects  Outcome: Progressing  Goal: Agree to be compliant with medication regime, as prescribed and report medication side effects  Description: Interventions:  - Offer appropriate PRN medication and supervise ingestion; conduct AIMS, as needed   Outcome: Progressing  Goal: Recognize dysfunctional thoughts, communicate reality-based thoughts at the time of discharge  Description: Interventions:  - Provide medication and psycho-education to assist patient in compliance and developing insight into his/her illness   Outcome: Progressing  Goal: Complete daily ADLs, including personal hygiene independently, as able  Description: Interventions:  - Observe, teach, and assist patient with ADLS  - Monitor and  promote a balance of rest/activity, with adequate nutrition and elimination   Outcome: Progressing     Problem: Ineffective Coping  Goal: Cooperates with admission process  Description: Interventions:   - Complete admission process  Outcome: Progressing  Goal: Identifies ineffective coping skills  Outcome: Progressing  Goal: Identifies healthy coping skills  Outcome: Progressing  Goal: Demonstrates healthy coping skills  Outcome: Progressing  Goal: Participates in unit activities  Description: Interventions:  - Provide therapeutic environment   - Provide required programming   - Redirect inappropriate behaviors   Outcome: Progressing  Goal: Patient/Family participate in treatment and DC plans  Description: Interventions:  - Provide therapeutic environment  Outcome: Progressing  Goal: Patient/Family verbalizes awareness of resources  Outcome: Progressing  Goal: Understands least restrictive measures  Description: Interventions:  - Utilize least restrictive behavior  Outcome: Progressing  Goal: Free from restraint events  Description: - Utilize least restrictive measures   - Provide behavioral interventions   - Redirect inappropriate behaviors   Outcome: Progressing     Problem: Risk for Self Injury/Neglect  Goal: Treatment Goal: Remain safe during length of stay, learn and adopt new coping skills, and be free of self-injurious ideation, impulses and acts at the time of discharge  Outcome: Progressing  Goal: Verbalize thoughts and feelings  Description: Interventions:  - Assess and re-assess patient's lethality and potential for self-injury  - Engage patient in 1:1 interactions, daily, for a minimum of 15 minutes  - Encourage patient to express feelings, fears, frustrations, hopes  - Establish rapport/trust with patient   Outcome: Progressing  Goal: Refrain from harming self  Description: Interventions:  - Monitor patient closely, per order  - Develop a trusting relationship  - Supervise medication ingestion, monitor  effects and side effects   Outcome: Progressing  Goal: Attend and participate in unit activities, including therapeutic, recreational, and educational groups  Description: Interventions:  - Provide therapeutic and educational activities daily, encourage attendance and participation, and document same in the medical record  - Obtain collateral information, encourage visitation and family involvement in care   Outcome: Progressing  Goal: Recognize maladaptive responses and adopt new coping mechanisms  Outcome: Progressing     Problem: Depression  Goal: Treatment Goal: Demonstrate behavioral control of depressive symptoms, verbalize feelings of improved mood/affect, and adopt new coping skills prior to discharge  Outcome: Progressing  Goal: Verbalize thoughts and feelings  Description: Interventions:  - Assess and re-assess patient's level of risk   - Engage patient in 1:1 interactions, daily, for a minimum of 15 minutes   - Encourage patient to express feelings, fears, frustrations, hopes   Outcome: Progressing  Goal: Refrain from harming self  Description: Interventions:  - Monitor patient closely, per order   - Supervise medication ingestion, monitor effects and side effects   Outcome: Progressing

## 2024-05-08 NOTE — NURSING NOTE
Pt is calm, cooperative and visible on unit. Pt denies depression and anxiety; denies SI/HI/AH/VH. Compliant with medications and meals. Utilizes nicorette gum as ordered. Social with staff and peers. Able to make needs known. Will maintain q7 min checks.

## 2024-05-08 NOTE — PLAN OF CARE
Pt attends group and visible.      Problem: Risk for Self Injury/Neglect  Goal: Attend and participate in unit activities, including therapeutic, recreational, and educational groups  Description: Interventions:  - Provide therapeutic and educational activities daily, encourage attendance and participation, and document same in the medical record  - Obtain collateral information, encourage visitation and family involvement in care   Outcome: Progressing

## 2024-05-08 NOTE — DISCHARGE INSTR - OTHER ORDERS
You are being discharged to [address] [place] [id number]     Triggers you have identified during your hospitalization that led to your admission include a suicide attempt, frequent suicidal ideation with a plan, and a distressed mood. Coping skills you have identified during your hospitalization include drinking, vaping, curling up in bed, and cuddling with your dog. If you are unable to deal with your distressed mood alone please contact your sister, Irais. If that is not effective and you continue to have suicidal ideation, a distressed mood, are feeling overwhelmed, and/or in crisis please contact [facility] Crisis at [phone], dial [phone] or go to the nearest emergency center.     [facility] Prevention Warm Line: [phone] Monday-Friday from 9 am - 4 pm.     The Prevention Hub in [facility] offers prevention resources to build resilience in the [facility] community. It is a clear connection point to access prevention based progamming and referrals for those in need of assistance with navigating local and state family support services being offered by the Prevention Hub and other providers. Trained staff are available on weekdays from 9am to 4pm to connect callers to local and state level resources and other supportive services.    National Suicide Hotline: 237.561.1286    Crisis Text Line: 363465      _______________________________________________________________________    [facility]    Go to the below website to find a meeting near you, or they even offer virtual.   [email or web address]     Cost: Free, no registration required  Program: 4-Point Recovery    [facility]’s 4-Point Program is the organization’s flagship secular program serving those with an addictive behavior, including both substance and activity/process addictions. Trained volunteer facilitators lead effective mutual support group discussions on these topics:    Building and maintaining motivation  Coping  with urges and cravings  Managing thoughts, feelings, and behaviors  Living a balanced life    Related tools consistent with evidence-based motivational and cognitive behavioral theories are also offered and discussed.  Specific audiences: Adults Welcome    _______________________________________________________________________    [facility] Services - Family [facility]  [email or web address]    Access Center   Contact Information (You can call this place to set up an appointments for Mental Health and Drug and Alcohol):  To learn more about our therapeutic services, to schedule an appointment, to make a referral, to inquire on accepted insurances, eligibility, and program locations, please contact our ACCESS Department at:    Phone:  [phone]  Email:  [email or web address]    Supported Employment   This is a free vocational rehabilitation program for [facility] that provides customized support services for adults with serious mental illness to help them find and maintain employment.     Supported Employment provides help with:    Individualized job placement  Job searching  Resume and cover letter building  Assistance with job applications   Interview preparation   On-site/off-site    Benefits management     Contact Information  Phone: [phone]  Email: [email or web address]   [facility] [address] [place] [address]    Eligibility   The Supported Employment program is available to [facility], age 18+ who are experiencing mental illness. Individuals must be diagnosed with a serious and persistent mental illness to be eligible.     Navigator Exchange Program (to help apply for Medicaid)     [facility] residents  [phone]  Assists with setting up services in the community, helps with obtaining insurance.     ___________________________________________________________________________      Nisha, or  Melissa, our Behavioral Health Nurse Navigators, will be calling you after your discharge, on the phone number that you provided.  They will be available as an additional support, if needed.     If you wish to speak with one of them, you may contact Nisha at [phone] or Melissa at [phone].

## 2024-05-09 PROCEDURE — 99232 SBSQ HOSP IP/OBS MODERATE 35: CPT | Performed by: STUDENT IN AN ORGANIZED HEALTH CARE EDUCATION/TRAINING PROGRAM

## 2024-05-09 RX ADMIN — ESCITALOPRAM OXALATE 20 MG: 10 TABLET ORAL at 08:20

## 2024-05-09 RX ADMIN — NICOTINE 14 MG: 14 PATCH, EXTENDED RELEASE TRANSDERMAL at 08:21

## 2024-05-09 RX ADMIN — NICOTINE POLACRILEX 2 MG: 2 GUM, CHEWING ORAL at 16:14

## 2024-05-09 RX ADMIN — TRAZODONE HYDROCHLORIDE 50 MG: 50 TABLET ORAL at 21:12

## 2024-05-09 RX ADMIN — CYANOCOBALAMIN TAB 1000 MCG 1000 MCG: 1000 TAB at 08:19

## 2024-05-09 RX ADMIN — ARIPIPRAZOLE 5 MG: 5 TABLET ORAL at 08:19

## 2024-05-09 RX ADMIN — MELATONIN TAB 3 MG 3 MG: 3 TAB at 21:12

## 2024-05-09 RX ADMIN — THIAMINE HCL TAB 100 MG 100 MG: 100 TAB at 08:20

## 2024-05-09 RX ADMIN — FOLIC ACID 1 MG: 1 TABLET ORAL at 08:20

## 2024-05-09 RX ADMIN — NALTREXONE HYDROCHLORIDE 50 MG: 50 TABLET, FILM COATED ORAL at 08:20

## 2024-05-09 RX ADMIN — NICOTINE POLACRILEX 2 MG: 2 GUM, CHEWING ORAL at 09:00

## 2024-05-09 RX ADMIN — NICOTINE POLACRILEX 2 MG: 2 GUM, CHEWING ORAL at 20:19

## 2024-05-09 RX ADMIN — MULTIPLE VITAMINS W/ MINERALS TAB 1 TABLET: TAB ORAL at 08:20

## 2024-05-09 RX ADMIN — ATORVASTATIN CALCIUM 10 MG: 10 TABLET, FILM COATED ORAL at 17:19

## 2024-05-09 RX ADMIN — NICOTINE POLACRILEX 2 MG: 2 GUM, CHEWING ORAL at 12:33

## 2024-05-09 RX ADMIN — AMLODIPINE BESYLATE 5 MG: 5 TABLET ORAL at 08:20

## 2024-05-09 NOTE — CMS CERTIFICATION NOTE
Recertification: Based upon physical, mental and social evaluations, I certify that inpatient psychiatric services continue to be medically necessary for this patient for a duration of 10 midnights for the treatment of  Severe episode of recurrent major depressive disorder, without psychotic features (HCC) Available alternative community resources still do not meet the patient's mental health care needs. I further attest that an established written individualized plan of care has been updated and is outlined in the patient's medical records.

## 2024-05-09 NOTE — PROGRESS NOTES
Progress Note - Behavioral Health   Christy Borjas 63 y.o. female MRN: [id number]  Unit/Bed#: [facility] 646-02 Encounter: [id number]       Patient was visited on unit for continuing care; chart reviewed and discussed with multidisciplinary treatment team.  On approach, the patient was calm and superficially cooperative. Endorsed better mood but continues to have negative thinking and ruminating thoughts. Continues to ruminate on psychosocial stressors, and was concerned about not finding her SS card and Birth certificate, but noted that will ask her sister to look in her files. She was preoccupied with steps she has to take following discharge. Supportive psychotherapy and reassurance provided and patient's emotions were validated and specific labeled praise provided. Denied any changes in appetite, and energy level. No problem initiating and maintaining sleep.  Denied A/VH currently.  Denied active SI/HI, intent or plan upon direct inquiry at this time. The patient consented for safety and agreed to verbalize any frustration or concerns to the nursing staff, immediately.    Patient continues to be selectively interactive with staff and peers. No reports of aggression or self-injurious behavior on unit. No PRN medications used in the past 24 hours.    Patient accepted all offered medications and no adverse effects of medications noted or reported. Abilify was uptitrated to 5 mg daily on 5/7/24 as the adjunct treatment for depression and was started on Trazodone 50 mg nightly for insomnia on 5/6/24; doses to be adjusted as indicated. The patient benefits from referral to dual diagnosis services upon further psychiatric stabilization. CM will schedule a family meeting with patient's sister (Irais) and brother in law (Milad) as the patient agreed to participate in the family meeting for safe dispo planning.      Current Mental Status Evaluation:  Appearance and attitude: appeared as stated age, dressed in hospital attire,  with good hygiene  Eye contact: good  Motor Function: within normal limits, No PMA/PMR  Gait/station: normal gait/station and normal balance  Speech: normal for rate, rhythm, volume, latency, amount  Language: No overt abnormality  Mood/affect: anxious, less depressed / Affect was constricted but reactive, mood congruent  Thought Processes: ruminating  Thought content: denied suicidal ideations or homicidal ideations, no overt delusions elicited, negative thinking, intrusive thoughts, ruminating thoughts  Associations: intact associations  Perceptual disturbances: denies Auditory/Visual/Tactile Hallucinations  Orientation: oriented to time, person, place and to the situational context  Cognitive Function: intact  Memory: recent and remote memory grossly intact  Intellect: average  Fund of knowledge: aware of current events, aware of past history, and vocabulary average  Impulse control: good  Insight/judgment: fair/fair    Pain: denied  Pain scale: 0      Vital signs in last 24 hours:    Temp:  [98 °F (36.7 °C)-99.3 °F (37.4 °C)] 99.3 °F (37.4 °C)  HR:  [89-98] 89  Resp:  [18-20] 20  BP: (117-135)/(76-83) 117/76    Laboratory results: I have personally reviewed all pertinent laboratory/tests results    Results from the past 24 hours: No results found for this or any previous visit (from the past 24 hour(s)).    Progress Toward Goals: making slow improvement    Assessment:  Principal Problem:    Severe episode of recurrent major depressive disorder, without psychotic features (HCC)  Active Problems:    Alcohol use disorder    Medical clearance for psychiatric admission    Tobacco use    Dyslipidemia    Hypertension        Plan:  - f/u [facility] recs regarding the medical problems   - Continue medication titration and treatment plan; adjust medication to optimize treatment response and as clinically indicated. .     Scheduled medications:  Current Facility-Administered Medications   Medication Dose Route Frequency Provider  Last Rate    acetaminophen  650 mg Oral Q6H PRN Sasha Marie MD      acetaminophen  650 mg Oral Q4H PRN Sasha Marie MD      acetaminophen  975 mg Oral Q6H PRN Sasha Marie MD      aluminum-magnesium hydroxide-simethicone  30 mL Oral Q4H PRN Sasha Marie MD      amLODIPine  5 mg Oral Daily Nellie Gama Elizabeth, CRJOYCE      ARIPiprazole  5 mg Oral Daily Levar Lira MD      atorvastatin  10 mg Oral Daily With Dinner Nellie Cardozajony Johnson, LAURENNP      haloperidol lactate  2.5 mg Intramuscular Q4H PRN Max 4/day Sasha Marie MD      And    LORazepam  1 mg Intramuscular Q4H PRN Max 4/day Sasha Marie MD      And    benztropine  0.5 mg Intramuscular Q4H PRN Max 4/day Sasha Marie MD      haloperidol lactate  5 mg Intramuscular Q4H PRN Max 4/day Sasha Marie MD      And    LORazepam  2 mg Intramuscular Q4H PRN Max 4/day Sasha Marie MD      And    benztropine  1 mg Intramuscular Q4H PRN Max 4/day Sasha Marie MD      benztropine  1 mg Oral Q4H PRN Max 6/day Sasha Marie MD      bisacodyl  10 mg Rectal Daily PRN Sasha Marie MD      cyanocobalamin  1,000 mcg Oral Daily Nellie Matthewsrakesh, SHILOH      hydrOXYzine HCL  50 mg Oral Q6H PRN Max 4/day Sasha Marie MD      Or    diphenhydrAMINE  50 mg Intramuscular Q6H PRN Sasha Marie MD      ergocalciferol  50,000 Units Oral Weekly Nellie Gama Elizabeth, CRJOYCE      escitalopram  20 mg Oral Daily Levar Lira MD      folic acid  1 mg Oral Daily Levar Lira MD      haloperidol  1 mg Oral Q6H PRN Sasha Marie MD      haloperidol  2.5 mg Oral Q4H PRN Max 4/day Sasha Marie MD      haloperidol  5 mg Oral Q4H PRN Max 4/day Sasha Marie MD      hydrOXYzine HCL  100 mg Oral Q6H PRN Max 4/day Sasha Marie MD      Or    LORazepam  2 mg Intramuscular Q6H PRN Sasha Marie MD      hydrOXYzine HCL  25 mg Oral Q6H PRN Max 4/day Sasha Marie MD      melatonin  3 mg Oral HS Sasha BAEZ  MD Cynthia      mirtazapine  7.5 mg Oral HS PRN Levar Lira MD      multivitamin-minerals  1 tablet Oral Daily Levar Lira MD      naltrexone  50 mg Oral Daily Levar Lira MD      nicotine  14 mg Transdermal Daily SHILOH Bruno      nicotine polacrilex  2 mg Oral Q2H PRN SHILOH Bruno      ondansetron  4 mg Oral Q8H PRN SHILOH Bruno      polyethylene glycol  17 g Oral Daily PRN Sasha Marie MD      senna-docusate sodium  1 tablet Oral Daily PRN Sasha Marie MD      Thiamine Mononitrate  100 mg Oral Daily Levar Lira MD      traZODone  50 mg Oral HS Levar Lira MD          PRN:    acetaminophen    acetaminophen    acetaminophen    aluminum-magnesium hydroxide-simethicone    haloperidol lactate **AND** LORazepam **AND** benztropine    haloperidol lactate **AND** LORazepam **AND** benztropine    benztropine    bisacodyl    hydrOXYzine HCL **OR** diphenhydrAMINE    haloperidol    haloperidol    haloperidol    hydrOXYzine HCL **OR** LORazepam    hydrOXYzine HCL    mirtazapine    nicotine polacrilex    ondansetron    polyethylene glycol    senna-docusate sodium    - Observation: routine            - VS: as per unit protocol  - Diet: Regular diet     - Psychoeducation (benefits and potential risks) discussed, importance of compliance with the psychiatric treatment reiterated, and the patient verbalized understanding of the matter  - Encourage group attendance and milieu therapy      - The pt was educated and agreed to verbalize any suicidal thoughts, frustrations or concerns to the nursing staff, immediately.   - Dispo: To be determined       Next of Kin  Extended Emergency Contact Information  Primary Emergency Contact: Irais Moncada  Mobile Phone: [phone]  Relation: Sister      Counseling / Coordination of Care  Patient's progress discussed with staff in treatment team meeting.  Medications, treatment progress and treatment plan reviewed with  patient.  Medication education provided to patient.  Coping with ongoing anxiety and stress reviewed with patient.  Coping mechanisms reviewed with patient.  Supportive therapy provided to patient.  Cognitive techniques utilized during the session.  Reassurance and supportive therapy provided.  Reoriented to reality and reassured.  Encouraged participation in milieu and group therapy on the unit.  Crisis/safety plan discussed with patient.  Discharge plan discussed with patient.       Levar Lira MD  Attending Psychiatrist   [facility]       This note was completed in part utilizing Dragon dictation Software. Grammatical, translation, syntax errors, random word insertions, spelling mistakes, and incomplete sentences may be an occasional consequence of this system secondary to software limitations with voice recognition, ambient noise, and hardware issues. If you have any questions or concerns about the content, text, or information contained within the body of this dictation, please contact the provider for clarification.

## 2024-05-09 NOTE — PROGRESS NOTES
05/09/24 0746   Team Meeting   Meeting Type Daily Rounds   Initial Conference Date 05/09/24   Next Conference Date 05/10/24   Team Members Present   Team Members Present Physician;Nurse;;   Physician Team Member Dr Lira, Dr Mariee, TOREY Persaud   Nursing Team Member Juany Garcia   Care Management Team Member Madai   Social Work Team Member Alison   Patient/Family Present   Patient Present No   Patient's Family Present No     Reading in her room, visible for meals, reporting that she feels a lot better, deny s/s, utilizing nicorette gum, social with staff and peers in the day, went to two groups, family meeting, Discharge pending.

## 2024-05-09 NOTE — CASE MANAGEMENT
Called the patient's sister, Irais Moncada ([phone]), to set up a family visit, left a voicemail.

## 2024-05-09 NOTE — NURSING NOTE
Pt very pleasant with even affect. Good appetite and steady gait.  Med-compliant.  VSS.  Attended group.  Denied SI.  Monitored for safety and support.

## 2024-05-09 NOTE — PROGRESS NOTES
05/09/24 1330   Activity/Group Checklist   Group Life Skills  (therapeutic password.)   Attendance Attended   Attendance Duration (min) 46-60   Interactions Interacted appropriately  (mild irritability toward another hyperverbal peer,)   Affect/Mood Normal range   Goals Achieved Able to listen to others;Able to engage in interactions;Discussed coping strategies;Identified feelings;Identified triggers

## 2024-05-10 PROCEDURE — 99232 SBSQ HOSP IP/OBS MODERATE 35: CPT | Performed by: STUDENT IN AN ORGANIZED HEALTH CARE EDUCATION/TRAINING PROGRAM

## 2024-05-10 RX ADMIN — NICOTINE POLACRILEX 2 MG: 2 GUM, CHEWING ORAL at 19:51

## 2024-05-10 RX ADMIN — ESCITALOPRAM OXALATE 20 MG: 10 TABLET ORAL at 08:13

## 2024-05-10 RX ADMIN — TRAZODONE HYDROCHLORIDE 50 MG: 50 TABLET ORAL at 21:22

## 2024-05-10 RX ADMIN — THIAMINE HCL TAB 100 MG 100 MG: 100 TAB at 08:13

## 2024-05-10 RX ADMIN — NALTREXONE HYDROCHLORIDE 50 MG: 50 TABLET, FILM COATED ORAL at 08:13

## 2024-05-10 RX ADMIN — ERGOCALCIFEROL 50000 UNITS: 1.25 CAPSULE, LIQUID FILLED ORAL at 08:13

## 2024-05-10 RX ADMIN — NICOTINE 14 MG: 14 PATCH, EXTENDED RELEASE TRANSDERMAL at 08:13

## 2024-05-10 RX ADMIN — ATORVASTATIN CALCIUM 10 MG: 10 TABLET, FILM COATED ORAL at 16:16

## 2024-05-10 RX ADMIN — FOLIC ACID 1 MG: 1 TABLET ORAL at 08:13

## 2024-05-10 RX ADMIN — NICOTINE POLACRILEX 2 MG: 2 GUM, CHEWING ORAL at 12:20

## 2024-05-10 RX ADMIN — CYANOCOBALAMIN TAB 1000 MCG 1000 MCG: 1000 TAB at 08:13

## 2024-05-10 RX ADMIN — MULTIPLE VITAMINS W/ MINERALS TAB 1 TABLET: TAB ORAL at 08:13

## 2024-05-10 RX ADMIN — NICOTINE POLACRILEX 2 MG: 2 GUM, CHEWING ORAL at 16:16

## 2024-05-10 RX ADMIN — AMLODIPINE BESYLATE 5 MG: 5 TABLET ORAL at 08:22

## 2024-05-10 RX ADMIN — ARIPIPRAZOLE 5 MG: 5 TABLET ORAL at 08:13

## 2024-05-10 RX ADMIN — NICOTINE POLACRILEX 2 MG: 2 GUM, CHEWING ORAL at 08:55

## 2024-05-10 NOTE — PLAN OF CARE
Pt attends group and participates.     Problem: Risk for Self Injury/Neglect  Goal: Attend and participate in unit activities, including therapeutic, recreational, and educational groups  Description: Interventions:  - Provide therapeutic and educational activities daily, encourage attendance and participation, and document same in the medical record  - Obtain collateral information, encourage visitation and family involvement in care   Outcome: Progressing

## 2024-05-10 NOTE — PROGRESS NOTES
05/10/24 0812   Team Meeting   Meeting Type Daily Rounds   Initial Conference Date 05/10/24   Next Conference Date 05/13/24   Team Members Present   Team Members Present Physician;Nurse;;   Physician Team Member Dr Lira, Dr Solomon, Dr Mariee, TOREY Cody   Nursing Team Member Juany Garcia   Care Management Team Member Madai   Social Work Team Member Alison   Patient/Family Present   Patient Present No   Patient's Family Present No     Discharge pending for Thursday, family meeting to be planned

## 2024-05-10 NOTE — NURSING NOTE
Christy is pleasant and cooperative. Interacts with staff and select peers. Christy is visible in the milieu but will go to room and read. Christy is utilizing nicotine gum prn. Christy denies SI,AH,VH or any anxiety or depression. Reports that she is feeling better. Christy is medication and meal compliant. Will continue to monitor and support during treatment.

## 2024-05-10 NOTE — PLAN OF CARE
Problem: Alteration in Thoughts and Perception  Goal: Treatment Goal: Gain control of psychotic behaviors/thinking, reduce/eliminate presenting symptoms and demonstrate improved reality functioning upon discharge  Outcome: Progressing  Goal: Verbalize thoughts and feelings  Description: Interventions:  - Promote a nonjudgmental and trusting relationship with the patient through active listening and therapeutic communication  - Assess patient's level of functioning, behavior and potential for risk  - Engage patient in 1 on 1 interactions  - Encourage patient to express fears, feelings, frustrations, and discuss symptoms    - [place] patient to reality, help patient recognize reality-based thinking   - Administer medications as ordered and assess for potential side effects  - Provide the patient education related to the signs and symptoms of the illness and desired effects of prescribed medications  Outcome: Progressing  Goal: Refrain from acting on delusional thinking/internal stimuli  Description: Interventions:  - Monitor patient closely, per order   - Utilize least restrictive measures   - Set reasonable limits, give positive feedback for acceptable   - Administer medications as ordered and monitor of potential side effects  Outcome: Progressing  Goal: Agree to be compliant with medication regime, as prescribed and report medication side effects  Description: Interventions:  - Offer appropriate PRN medication and supervise ingestion; conduct AIMS, as needed   Outcome: Progressing  Goal: Recognize dysfunctional thoughts, communicate reality-based thoughts at the time of discharge  Description: Interventions:  - Provide medication and psycho-education to assist patient in compliance and developing insight into his/her illness   Outcome: Progressing  Goal: Complete daily ADLs, including personal hygiene independently, as able  Description: Interventions:  - Observe, teach, and assist patient with ADLS  - Monitor and  promote a balance of rest/activity, with adequate nutrition and elimination   Outcome: Progressing     Problem: Ineffective Coping  Goal: Cooperates with admission process  Description: Interventions:   - Complete admission process  Outcome: Progressing  Goal: Identifies ineffective coping skills  Outcome: Progressing  Goal: Identifies healthy coping skills  Outcome: Progressing  Goal: Demonstrates healthy coping skills  Outcome: Progressing  Goal: Participates in unit activities  Description: Interventions:  - Provide therapeutic environment   - Provide required programming   - Redirect inappropriate behaviors   Outcome: Progressing  Goal: Patient/Family participate in treatment and DC plans  Description: Interventions:  - Provide therapeutic environment  Outcome: Progressing  Goal: Patient/Family verbalizes awareness of resources  Outcome: Progressing  Goal: Understands least restrictive measures  Description: Interventions:  - Utilize least restrictive behavior  Outcome: Progressing  Goal: Free from restraint events  Description: - Utilize least restrictive measures   - Provide behavioral interventions   - Redirect inappropriate behaviors   Outcome: Progressing     Problem: Risk for Self Injury/Neglect  Goal: Treatment Goal: Remain safe during length of stay, learn and adopt new coping skills, and be free of self-injurious ideation, impulses and acts at the time of discharge  Outcome: Progressing  Goal: Verbalize thoughts and feelings  Description: Interventions:  - Assess and re-assess patient's lethality and potential for self-injury  - Engage patient in 1:1 interactions, daily, for a minimum of 15 minutes  - Encourage patient to express feelings, fears, frustrations, hopes  - Establish rapport/trust with patient   Outcome: Progressing  Goal: Refrain from harming self  Description: Interventions:  - Monitor patient closely, per order  - Develop a trusting relationship  - Supervise medication ingestion, monitor  effects and side effects   Outcome: Progressing  Goal: Attend and participate in unit activities, including therapeutic, recreational, and educational groups  Description: Interventions:  - Provide therapeutic and educational activities daily, encourage attendance and participation, and document same in the medical record  - Obtain collateral information, encourage visitation and family involvement in care   Outcome: Progressing  Goal: Recognize maladaptive responses and adopt new coping mechanisms  Outcome: Progressing  Goal: Complete daily ADLs, including personal hygiene independently, as able  Description: Interventions:  - Observe, teach, and assist patient with ADLS  - Monitor and promote a balance of rest/activity, with adequate nutrition and elimination  Outcome: Progressing     Problem: Depression  Goal: Treatment Goal: Demonstrate behavioral control of depressive symptoms, verbalize feelings of improved mood/affect, and adopt new coping skills prior to discharge  Outcome: Progressing  Goal: Verbalize thoughts and feelings  Description: Interventions:  - Assess and re-assess patient's level of risk   - Engage patient in 1:1 interactions, daily, for a minimum of 15 minutes   - Encourage patient to express feelings, fears, frustrations, hopes   Outcome: Progressing  Goal: Refrain from harming self  Description: Interventions:  - Monitor patient closely, per order   - Supervise medication ingestion, monitor effects and side effects   Outcome: Progressing  Goal: Refrain from isolation  Description: Interventions:  - Develop a trusting relationship   - Encourage socialization   Outcome: Progressing  Goal: Refrain from self-neglect  Outcome: Progressing  Goal: Complete daily ADLs, including personal hygiene independently, as able  Description: Interventions:  - Observe, teach, and assist patient with ADLS  -  Monitor and promote a balance of rest/activity, with adequate nutrition and elimination   Outcome:  Progressing     Problem: Anxiety  Goal: Anxiety is at manageable level  Description: Interventions:  - Assess and monitor patient's anxiety level.   - Monitor for signs and symptoms (heart palpitations, chest pain, shortness of breath, headaches, nausea, feeling jumpy, restlessness, irritable, apprehensive).   - Collaborate with interdisciplinary team and initiate plan and interventions as ordered.  - [place] patient to unit/surroundings  - Explain treatment plan  - Encourage participation in care  - Encourage verbalization of concerns/fears  - Identify coping mechanisms  - Assist in developing anxiety-reducing skills  - Administer/offer alternative therapies  - Limit or eliminate stimulants  Outcome: Progressing     Problem: Alteration in Orientation  Goal: Treatment Goal: Demonstrate a reduction of confusion and improved orientation to person, place, time and/or situation upon discharge, according to optimum baseline/ability  Outcome: Progressing  Goal: Interact with staff daily  Description: Interventions:  - Assess and re-assess patient's level of orientation  - Engage patient in 1 on 1 interactions, daily, for a minimum of 15 minutes   - Establish rapport/trust with patient   Outcome: Progressing     Problem: DISCHARGE PLANNING - CARE MANAGEMENT  Goal: Discharge to post-acute care or home with appropriate resources  Description: INTERVENTIONS:  - Conduct assessment to determine patient/family and health care team treatment goals, and need for post-acute services based on payer coverage, community resources, and patient preferences, and barriers to discharge  - Address psychosocial, clinical, and financial barriers to discharge as identified in assessment in conjunction with the patient/family and health care team  - Arrange appropriate level of post-acute services according to patient’s   needs and preference and payer coverage in collaboration with the physician and health care team  - Communicate with and  update the patient/family, physician, and health care team regarding progress on the discharge plan  - Arrange appropriate transportation to post-acute venues  Outcome: Progressing

## 2024-05-10 NOTE — CASE MANAGEMENT
Family visit has been set up with the patient, patient's sister Irais and the team on Monday at 11 am.

## 2024-05-10 NOTE — PROGRESS NOTES
"  Progress Note - Behavioral Health   Christy Borjas 63 y.o. female MRN: [id number]  Unit/Bed#: [facility] 646-02 Encounter: [id number]       Patient was visited on unit for continuing care; chart reviewed and discussed with multidisciplinary treatment team.  On approach, the patient was calm and cooperative. Denied any changes in mood, appetite, and energy level. The patient became tearful while was talking about her relationship with his children, noted that she tried to be a good mother and thought she had a good relationship with them, but felt upset as they \"dismissed\" her while she was dealing with relationship issues and stressors with her  and also since being in [facility]. She has not seen her grandchildren for about 1.5 yrs and reportedly her children even do not send her a happy birthday text or asking how she was doing.  No problem initiating and maintaining sleep but continues to have \"dreams\" / nightmares with the same theme which has been worse since being on Melatonin.  Denied A/VH currently.  Continues to report intrusive thoughts and negative ruminations. Denied active SI/HI, intent or plan upon direct inquiry at this time. The patient consented for safety and agreed to verbalize any frustration or concerns to the nursing staff, immediately.    Patient continues to be visible in the milieu and interacts with staff and peers. No reports of aggression or self-injurious behavior on unit. No PRN medications used in the past 24 hours.    Patient accepted all offered medications and no adverse effects of medications noted or reported. Abilify is being uptitrated to 7 mg po daily tomorrow. Melatonin was held due to increased nightmares and maintained on Trazodone 50 mg nightly. Doses to be adjusted as indicated. The patient benefits from referral to dual diagnosis services upon further psychiatric stabilization. CM will schedule a family meeting with patient's sister (Irais) and brother in law (Milad) " as the patient agreed to participate in the family meeting for safe dispo planning.      Current Mental Status Evaluation:  Appearance and attitude: appeared as stated age, casually dressed, with good hygiene  Eye contact: good  Motor Function: within normal limits, intact gait, No PMA/PMR  Gait/station: normal gait/station and normal balance  Speech: normal for rate, rhythm, volume, latency, amount  Language: No overt abnormality  Mood/affect: depressed / Affect was constricted but reactive, mood congruent, tearful  Thought Processes: linear  Thought content: denied suicidal ideations or homicidal ideations, no overt delusions elicited, negative thoughts, ruminations  Associations: intact associations  Perceptual disturbances: denies Auditory/Visual/Tactile Hallucinations  Orientation: oriented to time, person, place and to the situational context  Cognitive Function: intact  Memory: recent and remote memory grossly intact  Intellect: average  Fund of knowledge: aware of current events, aware of past history, and vocabulary average  Impulse control: good  Insight/judgment: fair/fair    Vital signs in last 24 hours:    Temp:  [97.5 °F (36.4 °C)-98.2 °F (36.8 °C)] 98.2 °F (36.8 °C)  HR:  [70-82] 82  Resp:  [16-17] 17  BP: (121-137)/(74-90) 130/80    Laboratory results: I have personally reviewed all pertinent laboratory/tests results    Results from the past 24 hours: No results found for this or any previous visit (from the past 24 hour(s)).    Progress Toward Goals: limited improvement    Assessment:  Principal Problem:    Severe episode of recurrent major depressive disorder, without psychotic features (HCC)  Active Problems:    Alcohol use disorder    Medical clearance for psychiatric admission    Tobacco use    Dyslipidemia    Hypertension        Plan:  - f/u SLIM recs regarding the medical problems   - f/u on scheduling the family meeting  - Continue medication titration and treatment plan; adjust medication to  optimize treatment response and as clinically indicated. .     Scheduled medications:  Current Facility-Administered Medications   Medication Dose Route Frequency Provider Last Rate    acetaminophen  650 mg Oral Q6H PRN Sasha Marie MD      acetaminophen  650 mg Oral Q4H PRN Sasha Marie MD      acetaminophen  975 mg Oral Q6H PRN Sasha Marie MD      aluminum-magnesium hydroxide-simethicone  30 mL Oral Q4H PRN Sasha Marie MD      amLODIPine  5 mg Oral Daily SHILOH Bruno      [START ON 5/11/2024] ARIPiprazole  7 mg Oral Daily Levar Lira MD      atorvastatin  10 mg Oral Daily With Dinner SHILOH Bruno      haloperidol lactate  2.5 mg Intramuscular Q4H PRN Max 4/day Sasha Maire MD      And    LORazepam  1 mg Intramuscular Q4H PRN Max 4/day Sasha Marie MD      And    benztropine  0.5 mg Intramuscular Q4H PRN Max 4/day Sasha Marie MD      haloperidol lactate  5 mg Intramuscular Q4H PRN Max 4/day Sasha Marie MD      And    LORazepam  2 mg Intramuscular Q4H PRN Max 4/day Sasha Marie MD      And    benztropine  1 mg Intramuscular Q4H PRN Max 4/day Sasha Marie MD      benztropine  1 mg Oral Q4H PRN Max 6/day Sasha Marie MD      bisacodyl  10 mg Rectal Daily PRN Sasha Marie MD      cyanocobalamin  1,000 mcg Oral Daily SHILOH Bruno      hydrOXYzine HCL  50 mg Oral Q6H PRN Max 4/day Sasha Marie MD      Or    diphenhydrAMINE  50 mg Intramuscular Q6H PRN Sasha Marie MD      ergocalciferol  50,000 Units Oral Weekly SHILOH Bruno      escitalopram  20 mg Oral Daily Levar Lira MD      folic acid  1 mg Oral Daily Levar Lira MD      haloperidol  1 mg Oral Q6H PRN Sasha Marie MD      haloperidol  2.5 mg Oral Q4H PRN Max 4/day Sasha Marie MD      haloperidol  5 mg Oral Q4H PRN Max 4/day Sasha Marie MD      hydrOXYzine HCL  100 mg Oral Q6H PRN Max 4/day Sasha BAEZ  MD Cynthia      Or    LORazepam  2 mg Intramuscular Q6H PRN Sasha Marie MD      hydrOXYzine HCL  25 mg Oral Q6H PRN Max 4/day Sasha Marie MD      mirtazapine  7.5 mg Oral HS PRN Levar Lira MD      multivitamin-minerals  1 tablet Oral Daily Levar Lira MD      naltrexone  50 mg Oral Daily Levar Lira MD      nicotine  14 mg Transdermal Daily SHILOH Bruno      nicotine polacrilex  2 mg Oral Q2H PRN SHILOH Bruno      ondansetron  4 mg Oral Q8H PRN SHILOH Bruno      polyethylene glycol  17 g Oral Daily PRN Sasha Marie MD      senna-docusate sodium  1 tablet Oral Daily PRN Sasha Marie MD      Thiamine Mononitrate  100 mg Oral Daily Levar Lira MD      traZODone  50 mg Oral HS Levar Lira MD          PRN:    acetaminophen    acetaminophen    acetaminophen    aluminum-magnesium hydroxide-simethicone    haloperidol lactate **AND** LORazepam **AND** benztropine    haloperidol lactate **AND** LORazepam **AND** benztropine    benztropine    bisacodyl    hydrOXYzine HCL **OR** diphenhydrAMINE    haloperidol    haloperidol    haloperidol    hydrOXYzine HCL **OR** LORazepam    hydrOXYzine HCL    mirtazapine    nicotine polacrilex    ondansetron    polyethylene glycol    senna-docusate sodium    - Observation: routine            - VS: as per unit protocol  - Diet: Regular diet     - Psychoeducation (benefits and potential risks) discussed, importance of compliance with the psychiatric treatment reiterated, and the patient verbalized understanding of the matter  - Encourage group attendance and milieu therapy      - The pt was educated and agreed to verbalize any suicidal thoughts, frustrations or concerns to the nursing staff, immediately.   - Dispo: To be determined       Next of Kin  Extended Emergency Contact Information  Primary Emergency Contact: Irais Moncada  Mobile Phone: [phone]  Relation: Sister      Counseling / Coordination of  Care  Patient's progress discussed with staff in treatment team meeting.  Medications, treatment progress and treatment plan reviewed with patient.  Medication education provided to patient.  Coping with relationship issues and stress reviewed with patient.  Coping mechanisms reviewed with patient.  Supportive therapy provided to patient.  Cognitive techniques utilized during the session.  Reassurance and supportive therapy provided.  Reoriented to reality and reassured.  Encouraged participation in milieu and group therapy on the unit.  Crisis/safety plan discussed with patient.       Levar Lira MD  Attending Psychiatrist   [facility]       This note was completed in part utilizing Dragon dictation Software. Grammatical, translation, syntax errors, random word insertions, spelling mistakes, and incomplete sentences may be an occasional consequence of this system secondary to software limitations with voice recognition, ambient noise, and hardware issues. If you have any questions or concerns about the content, text, or information contained within the body of this dictation, please contact the provider for clarification.

## 2024-05-10 NOTE — PROGRESS NOTES
Pt attended all groups.  Pt able to self express.  Incongruent and underlying depressive symptoms noted in group setting.     05/10/24 1330   Activity/Group Checklist   Group Other (Comment)  (Positive reflection, reminiscing and humor)   Attendance Attended   Attendance Duration (min) 46-60   Interactions Interacted appropriately   Affect/Mood Incongruent   Goals Achieved Identified feelings;Identified relapse prevention strategies;Discussed self-esteem issues;Discussed coping strategies;Able to engage in interactions;Able to listen to others;Able to reflect/comment on own behavior;Able to manage/cope with feelings;Verbalized increased hopefulness;Able to self-disclose

## 2024-05-10 NOTE — NURSING NOTE
Pt pleasant and med-compliant.  Attended group and social with peers.  VSS.  Good appetite and steady gait.  Denied SI.  Monitored for safety and support.

## 2024-05-10 NOTE — NURSING NOTE
Pt pleasant and calm. Stayed withdrawn to room reading this evening. Meal and medication compliant. Denies SI/HI. Q 7 minute checks maintained.

## 2024-05-11 PROCEDURE — 99232 SBSQ HOSP IP/OBS MODERATE 35: CPT | Performed by: PSYCHIATRY & NEUROLOGY

## 2024-05-11 RX ADMIN — TRAZODONE HYDROCHLORIDE 50 MG: 50 TABLET ORAL at 21:09

## 2024-05-11 RX ADMIN — NICOTINE POLACRILEX 2 MG: 2 GUM, CHEWING ORAL at 08:51

## 2024-05-11 RX ADMIN — NICOTINE POLACRILEX 2 MG: 2 GUM, CHEWING ORAL at 13:05

## 2024-05-11 RX ADMIN — CYANOCOBALAMIN TAB 1000 MCG 1000 MCG: 1000 TAB at 08:27

## 2024-05-11 RX ADMIN — MULTIPLE VITAMINS W/ MINERALS TAB 1 TABLET: TAB ORAL at 08:27

## 2024-05-11 RX ADMIN — NICOTINE POLACRILEX 2 MG: 2 GUM, CHEWING ORAL at 16:23

## 2024-05-11 RX ADMIN — NALTREXONE HYDROCHLORIDE 50 MG: 50 TABLET, FILM COATED ORAL at 08:27

## 2024-05-11 RX ADMIN — NICOTINE POLACRILEX 2 MG: 2 GUM, CHEWING ORAL at 19:48

## 2024-05-11 RX ADMIN — ARIPIPRAZOLE 7 MG: 5 TABLET ORAL at 08:27

## 2024-05-11 RX ADMIN — THIAMINE HCL TAB 100 MG 100 MG: 100 TAB at 08:27

## 2024-05-11 RX ADMIN — ESCITALOPRAM OXALATE 20 MG: 10 TABLET ORAL at 08:27

## 2024-05-11 RX ADMIN — NICOTINE 14 MG: 14 PATCH, EXTENDED RELEASE TRANSDERMAL at 08:31

## 2024-05-11 RX ADMIN — FOLIC ACID 1 MG: 1 TABLET ORAL at 08:27

## 2024-05-11 RX ADMIN — ATORVASTATIN CALCIUM 10 MG: 10 TABLET, FILM COATED ORAL at 17:18

## 2024-05-11 NOTE — NURSING NOTE
"Patient is calm and cooperative with care.Patient denies all psych S/S. Patient is bright and interacts with staff and peers. Patient is visible in the milieu. Patient states \" I'm doing good, everyday is a better day.\" Patient is medication and meal compliant. Appetite good. Patient able and encouraged to notify staff of any need. Safety checks ongoing.   "

## 2024-05-11 NOTE — PLAN OF CARE
Problem: Alteration in Thoughts and Perception  Goal: Refrain from acting on delusional thinking/internal stimuli  Description: Interventions:  - Monitor patient closely, per order   - Utilize least restrictive measures   - Set reasonable limits, give positive feedback for acceptable   - Administer medications as ordered and monitor of potential side effects  Outcome: Progressing     Problem: Ineffective Coping  Goal: Cooperates with admission process  Description: Interventions:   - Complete admission process  Outcome: Completed  Goal: Demonstrates healthy coping skills  Outcome: Progressing  Goal: Free from restraint events  Description: - Utilize least restrictive measures   - Provide behavioral interventions   - Redirect inappropriate behaviors   Outcome: Progressing     Problem: Risk for Self Injury/Neglect  Goal: Refrain from harming self  Description: Interventions:  - Monitor patient closely, per order  - Develop a trusting relationship  - Supervise medication ingestion, monitor effects and side effects   Outcome: Progressing     Problem: Depression  Goal: Complete daily ADLs, including personal hygiene independently, as able  Description: Interventions:  - Observe, teach, and assist patient with ADLS  -  Monitor and promote a balance of rest/activity, with adequate nutrition and elimination   Outcome: Progressing

## 2024-05-11 NOTE — PROGRESS NOTES
"Progress Note - Behavioral Health     Christy Borjas 63 y.o. female MRN: [id number]   Unit/Bed#: [facility] 646-02 Encounter: [id number]    Behavior over the last 24 hours: some improvement.     Christy was seen and evaluated today. Per nursing, patient is pleasant and cooperative. Interacts with staff and select peers. Christy is visible in the milieu but will go to room and read. Christy is utilizing nicotine gum prn. Christy denies SI,AH,VH or any anxiety or depression. Reports that she is feeling better. Christy is medication and meal compliant.       Today patient states her mood is \"great\". She reports that she was admitted last week for help with depression, states that she feels \"much better\" in the hospital without stressors at home. She reports that there is one peer here in the hospital who has been \"triggering\" her, but she makes sure to avoid that peer and leaves the area if the peer is \"triggering\". She has not required any PRNs for anxiety. She feels her current medication regimen is helpful and she has been attending groups and socializing with peers, which has been helpful for her mood. She lives with her sister and her sister's , states that the situation is \"semi-permanent\" until she's able to save enough money to eventually get her own place (not working currently). She feels overall that her sister and her sister's  are supportive, but admits to \"butting head\" with sister sometimes. In regard to medication tolerance, denies side effects.  Patient denies SI/HI/AH/VH. In regard to sleep and appetite, denies disturbances.  No acute events over the past 24H.       ROS: no complaints, all other systems are negative    Mental Status Evaluation:  Appearance and attitude: casually dressed, adequate grooming, calm and cooperative, good eye contact  Gait/station: normal gait/station and normal balance  Speech: normal for rate, rhythm, volume, talkative  Mood/affect: \"great\", mildly constricted though " reactive  Thought Processes: linear, goal directed  Thought content: denied suicidal ideations or homicidal ideations, no overt delusions elicited, negative thoughts, ruminations  Associations: intact associations  Perceptual disturbances: denies Auditory/Visual/Tactile Hallucinations  Orientation: oriented to time, person, place and to the situational context  Memory: recent and remote memory grossly intact  Insight/judgment: fair/fair    Vital signs in last 24 hours:    Temp:  [96.8 °F (36 °C)-97.8 °F (36.6 °C)] 97.2 °F (36.2 °C)  HR:  [76-94] 94  Resp:  [16-18] 16  BP: (126-145)/(77-84) 126/77    Laboratory results: I have personally reviewed all pertinent laboratory/tests results    Results from the past 24 hours: No results found for this or any previous visit (from the past 24 hour(s)).    Progress Toward Goals: progressing    Assessment/Plan   Principal Problem:    Severe episode of recurrent major depressive disorder, without psychotic features (HCC)  Active Problems:    Alcohol use disorder    Medical clearance for psychiatric admission    Tobacco use    Dyslipidemia    Hypertension      Recommended Treatment:     Planned medication and treatment changes:    All current active medications have been reviewed  Encourage group therapy, milieu therapy and occupational therapy  Behavioral Health checks every 7 minutes    Continue current medications:    Abilify is being uptitrated to 7 mg po daily today.   Melatonin was held due to increased nightmares;  Maintained on Trazodone 50 mg nightly. Doses to be adjusted as indicated.   Per chart review, patient has agreed to attend meeting with sister (Irais) and brother in law (Milad) as the patient agreed to participate in the family meeting for safe dispo planning.     Current Facility-Administered Medications   Medication Dose Route Frequency Provider Last Rate    acetaminophen  650 mg Oral Q6H PRN Sahsa Marie MD      acetaminophen  650 mg Oral Q4H PRARAMIS BAEZ  MD Cynthia      acetaminophen  975 mg Oral Q6H PRN Sasha Marie MD      aluminum-magnesium hydroxide-simethicone  30 mL Oral Q4H PRN Sasha Marie MD      amLODIPine  5 mg Oral Daily Nellie Matthewsrakesh, CRNP      ARIPiprazole  7 mg Oral Daily Levar Lira MD      atorvastatin  10 mg Oral Daily With Dinner Nellie Gama Elizabeth, CRNP      haloperidol lactate  2.5 mg Intramuscular Q4H PRN Max 4/day Sasha Marie MD      And    LORazepam  1 mg Intramuscular Q4H PRN Max 4/day Sasha Marie MD      And    benztropine  0.5 mg Intramuscular Q4H PRN Max 4/day Sasha Marie MD      haloperidol lactate  5 mg Intramuscular Q4H PRN Max 4/day Sasha Marie MD      And    LORazepam  2 mg Intramuscular Q4H PRN Max 4/day Sasha Marie MD      And    benztropine  1 mg Intramuscular Q4H PRN Max 4/day Sasha Marie MD      benztropine  1 mg Oral Q4H PRN Max 6/day Sasha Marie MD      bisacodyl  10 mg Rectal Daily PRN Sasha Marie MD      cyanocobalamin  1,000 mcg Oral Daily Nellie Matthewsrakesh, CRNP      hydrOXYzine HCL  50 mg Oral Q6H PRN Max 4/day Sasha Marie MD      Or    diphenhydrAMINE  50 mg Intramuscular Q6H PRN Sasha Marie MD      ergocalciferol  50,000 Units Oral Weekly Nellie Gama Elizabeth, CRNP      escitalopram  20 mg Oral Daily Levar Lira MD      folic acid  1 mg Oral Daily Levar Lira MD      haloperidol  1 mg Oral Q6H PRN Sasha Marie MD      haloperidol  2.5 mg Oral Q4H PRN Max 4/day Sasha Marie MD      haloperidol  5 mg Oral Q4H PRN Max 4/day Sasha Marie MD      hydrOXYzine HCL  100 mg Oral Q6H PRN Max 4/day Sasha Marie MD      Or    LORazepam  2 mg Intramuscular Q6H PRN Sasha Marie MD      hydrOXYzine HCL  25 mg Oral Q6H PRN Max 4/day Sasha Marie MD      mirtazapine  7.5 mg Oral HS PRN Levar Lira MD      multivitamin-minerals  1 tablet Oral Daily Levar Lira MD      naltrexone  50 mg Oral Daily Levar  MD Soraya      nicotine  14 mg Transdermal Daily SHILOH Bruno      nicotine polacrilex  2 mg Oral Q2H PRN SHILOH Bruno      ondansetron  4 mg Oral Q8H PRN SHILOH Bruno      polyethylene glycol  17 g Oral Daily PRN Sasha Marie MD      senna-docusate sodium  1 tablet Oral Daily PRN Sasha Marie MD      Thiamine Mononitrate  100 mg Oral Daily Levar Lira MD      traZODone  50 mg Oral HS Levar Lira MD       Risks / Benefits of Treatment:    Risks, benefits, and possible side effects of medications explained to patient and patient verbalizes understanding and agreement for treatment.    Counseling / Coordination of Care:    Patient's progress discussed with staff in treatment team meeting.  Medications, treatment progress and treatment plan reviewed with patient.    Melissa Moreno PA-C 05/11/24

## 2024-05-12 PROCEDURE — 99232 SBSQ HOSP IP/OBS MODERATE 35: CPT | Performed by: PSYCHIATRY & NEUROLOGY

## 2024-05-12 RX ADMIN — CYANOCOBALAMIN TAB 1000 MCG 1000 MCG: 1000 TAB at 08:25

## 2024-05-12 RX ADMIN — ARIPIPRAZOLE 7 MG: 5 TABLET ORAL at 08:25

## 2024-05-12 RX ADMIN — NICOTINE POLACRILEX 2 MG: 2 GUM, CHEWING ORAL at 17:13

## 2024-05-12 RX ADMIN — FOLIC ACID 1 MG: 1 TABLET ORAL at 08:25

## 2024-05-12 RX ADMIN — NICOTINE POLACRILEX 2 MG: 2 GUM, CHEWING ORAL at 08:26

## 2024-05-12 RX ADMIN — ESCITALOPRAM OXALATE 20 MG: 10 TABLET ORAL at 08:25

## 2024-05-12 RX ADMIN — NICOTINE POLACRILEX 2 MG: 2 GUM, CHEWING ORAL at 12:39

## 2024-05-12 RX ADMIN — NICOTINE 14 MG: 14 PATCH, EXTENDED RELEASE TRANSDERMAL at 08:25

## 2024-05-12 RX ADMIN — TRAZODONE HYDROCHLORIDE 50 MG: 50 TABLET ORAL at 21:15

## 2024-05-12 RX ADMIN — NALTREXONE HYDROCHLORIDE 50 MG: 50 TABLET, FILM COATED ORAL at 08:25

## 2024-05-12 RX ADMIN — THIAMINE HCL TAB 100 MG 100 MG: 100 TAB at 08:25

## 2024-05-12 RX ADMIN — ATORVASTATIN CALCIUM 10 MG: 10 TABLET, FILM COATED ORAL at 16:54

## 2024-05-12 RX ADMIN — MULTIPLE VITAMINS W/ MINERALS TAB 1 TABLET: TAB ORAL at 08:25

## 2024-05-12 RX ADMIN — NICOTINE POLACRILEX 2 MG: 2 GUM, CHEWING ORAL at 19:59

## 2024-05-12 NOTE — NURSING NOTE
Patient is calm and cooperative. She denies symptoms. She is bright in affect. She is present and social in the milieu.

## 2024-05-12 NOTE — PROGRESS NOTES
"Progress Note - Behavioral Health     Christy Bojras 63 y.o. female MRN: [id number]   Unit/Bed#: [facility] 646-02 Encounter: [id number]    Behavior over the last 24 hours: improving.     Christy was seen and evaluated today. Per nursing, patient is calm and cooperative with care.Patient denies all psych S/S. Patient is bright and interacts with staff and peers. Patient is visible in the milieu. Patient states \" I'm doing good, everyday is a better day.\" Patient is medication and meal compliant. Appetite good.     Today patient states her mood is \"better\". She is looking forward to a family meeting next week where she will have the opportunity to \"break the ice\" before she returns to the home of her sister and brother-in-law (she had disappointed her sister by starting to drink again and was suicidal while in her home) Per chart review, patient's sister is requiring her to attend AA meetings and therapy if she is going to be allowed to return to her home. Christy reports feeling \"less depessed\" in the hospital, but with concerns about how she will continue to deal with stressors (alcohol use) when she returns to her sister's home. She feels that her sister is overall supportive and that her brother-in-law is as well, feels that they will be able to make it work as a family unit but that she will have to do most of the work.   In regard to medication tolerance, denies side effects.    Patient denies SI/HI/AH/VH. In regard to sleep and appetite, denies disturabances.  No acute events over the past 24H.       ROS: no complaints, all other systems are negative    Mental Status Evaluation:    Appearance and attitude: casually dressed, adequate grooming, calm and cooperative, good eye contact  Gait/station: normal gait/station and normal balance  Speech: normal for rate, rhythm, volume, talkative  Mood/affect: \"better\", mildly constricted though reactive  Thought Processes: linear, goal directed  Thought content: denied suicidal " ideations or homicidal ideations, no overt delusions elicited, negative thoughts, ruminations  Associations: intact associations  Perceptual disturbances: denies Auditory/Visual/Tactile Hallucinations  Orientation: oriented to time, person, place and to the situational context  Memory: recent and remote memory grossly intact  Insight/judgment: fair/fair     Vital signs in last 24 hours:    Temp:  [97.5 °F (36.4 °C)-98.2 °F (36.8 °C)] 97.5 °F (36.4 °C)  HR:  [69-70] 69  Resp:  [16-18] 18  BP: (113-136)/(75-77) 113/75    Laboratory results: I have personally reviewed all pertinent laboratory/tests results    Results from the past 24 hours: No results found for this or any previous visit (from the past 24 hour(s)).    Progress Toward Goals: improving    Assessment/Plan   Principal Problem:    Severe episode of recurrent major depressive disorder, without psychotic features (HCC)  Active Problems:    Alcohol use disorder    Medical clearance for psychiatric admission    Tobacco use    Dyslipidemia    Hypertension      Recommended Treatment:     Planned medication and treatment changes:    All current active medications have been reviewed  Encourage group therapy, milieu therapy and occupational therapy  Behavioral Health checks every 7 minutes  Continue current medications:      Abilify is being uptitrated to 7 mg po daily on Saturday.   Maintained on Trazodone 50 mg nightly. Doses to be adjusted as indicated.   Per chart review, patient has agreed to attend meeting with sister (Irais) and brother in law (Milad) as the patient agreed to participate in the family meeting for safe dispo planning.    Current Facility-Administered Medications   Medication Dose Route Frequency Provider Last Rate    acetaminophen  650 mg Oral Q6H PRN Sasha Marie MD      acetaminophen  650 mg Oral Q4H PRN Sasha Marie MD      acetaminophen  975 mg Oral Q6H PRN Sasha Marie MD      aluminum-magnesium hydroxide-simethicone  30 mL  Oral Q4H PRN Sasha Marie MD      amLODIPine  5 mg Oral Daily Nellie Johnson, CRNP      ARIPiprazole  7 mg Oral Daily Levar Lira MD      atorvastatin  10 mg Oral Daily With Dinner Nellie Johnson, CRNP      haloperidol lactate  2.5 mg Intramuscular Q4H PRN Max 4/day Sasha Marie MD      And    LORazepam  1 mg Intramuscular Q4H PRN Max 4/day Sasha Marie MD      And    benztropine  0.5 mg Intramuscular Q4H PRN Max 4/day Sasha Marie MD      haloperidol lactate  5 mg Intramuscular Q4H PRN Max 4/day Sasha Marie MD      And    LORazepam  2 mg Intramuscular Q4H PRN Max 4/day Sasha Marie MD      And    benztropine  1 mg Intramuscular Q4H PRN Max 4/day Sasha Marie MD      benztropine  1 mg Oral Q4H PRN Max 6/day Sasha Marie MD      bisacodyl  10 mg Rectal Daily PRN Sasha Marie MD      cyanocobalamin  1,000 mcg Oral Daily Nellie Johnson, CRNP      hydrOXYzine HCL  50 mg Oral Q6H PRN Max 4/day Sasha Marie MD      Or    diphenhydrAMINE  50 mg Intramuscular Q6H PRN Sasha Marie MD      ergocalciferol  50,000 Units Oral Weekly Nellie Johnson, CRNP      escitalopram  20 mg Oral Daily Levar Lira MD      folic acid  1 mg Oral Daily Levar Lira MD      haloperidol  1 mg Oral Q6H PRN Sasha Marie MD      haloperidol  2.5 mg Oral Q4H PRN Max 4/day Sasha Marie MD      haloperidol  5 mg Oral Q4H PRN Max 4/day Sasha Marie MD      hydrOXYzine HCL  100 mg Oral Q6H PRN Max 4/day Sasha Marie MD      Or    LORazepam  2 mg Intramuscular Q6H PRN Sasha Marie MD      hydrOXYzine HCL  25 mg Oral Q6H PRN Max 4/day Sasha Marie MD      mirtazapine  7.5 mg Oral HS PRN Levar Lira MD      multivitamin-minerals  1 tablet Oral Daily Levar Lira MD      naltrexone  50 mg Oral Daily Levar Lira MD      nicotine  14 mg Transdermal Daily SHILOH Bruno      nicotine polacrilex  2 mg Oral Q2H PRN  SHILOH Bruno      ondansetron  4 mg Oral Q8H PRN SHILOH Bruno      polyethylene glycol  17 g Oral Daily PRN Sasha Marie MD      senna-docusate sodium  1 tablet Oral Daily PRN Sasha Marie MD      Thiamine Mononitrate  100 mg Oral Daily Levar Lira MD      traZODone  50 mg Oral HS Levar Lira MD       Risks / Benefits of Treatment:    Risks, benefits, and possible side effects of medications explained to patient and patient verbalizes understanding and agreement for treatment.    Counseling / Coordination of Care:    Patient's progress discussed with staff in treatment team meeting.  Medications, treatment progress and treatment plan reviewed with patient.    Melissa Moreno PA-C 05/12/24

## 2024-05-12 NOTE — PLAN OF CARE
Problem: Alteration in Thoughts and Perception  Goal: Treatment Goal: Gain control of psychotic behaviors/thinking, reduce/eliminate presenting symptoms and demonstrate improved reality functioning upon discharge  Outcome: Progressing  Goal: Verbalize thoughts and feelings  Description: Interventions:  - Promote a nonjudgmental and trusting relationship with the patient through active listening and therapeutic communication  - Assess patient's level of functioning, behavior and potential for risk  - Engage patient in 1 on 1 interactions  - Encourage patient to express fears, feelings, frustrations, and discuss symptoms    - [facility] [place] patient to reality, help patient recognize reality-based thinking   - Administer medications as ordered and assess for potential side effects  - Provide the patient education related to the signs and symptoms of the illness and desired effects of prescribed medications  Outcome: Progressing  Goal: Refrain from acting on delusional thinking/internal stimuli  Description: Interventions:  - Monitor patient closely, per order   - Utilize least restrictive measures   - Set reasonable limits, give positive feedback for acceptable   - Administer medications as ordered and monitor of potential side effects  Outcome: Progressing  Goal: Agree to be compliant with medication regime, as prescribed and report medication side effects  Description: Interventions:  - Offer appropriate PRN medication and supervise ingestion; conduct AIMS, as needed   Outcome: Progressing  Goal: Recognize dysfunctional thoughts, communicate reality-based thoughts at the time of discharge  Description: Interventions:  - Provide medication and psycho-education to assist patient in compliance and developing insight into his/her illness   Outcome: Progressing  Goal: Complete daily ADLs, including personal hygiene independently, as able  Description: Interventions:  - Observe, teach, and assist patient with ADLS  - Monitor and  promote a balance of rest/activity, with adequate nutrition and elimination   Outcome: Progressing     Problem: Depression  Goal: Treatment Goal: Demonstrate behavioral control of depressive symptoms, verbalize feelings of improved mood/affect, and adopt new coping skills prior to discharge  Outcome: Progressing  Goal: Verbalize thoughts and feelings  Description: Interventions:  - Assess and re-assess patient's level of risk   - Engage patient in 1:1 interactions, daily, for a minimum of 15 minutes   - Encourage patient to express feelings, fears, frustrations, hopes   Outcome: Progressing  Goal: Refrain from harming self  Description: Interventions:  - Monitor patient closely, per order   - Supervise medication ingestion, monitor effects and side effects   Outcome: Progressing  Goal: Refrain from isolation  Description: Interventions:  - Develop a trusting relationship   - Encourage socialization   Outcome: Progressing  Goal: Refrain from self-neglect  Outcome: Progressing  Goal: Complete daily ADLs, including personal hygiene independently, as able  Description: Interventions:  - Observe, teach, and assist patient with ADLS  -  Monitor and promote a balance of rest/activity, with adequate nutrition and elimination   Outcome: Progressing

## 2024-05-12 NOTE — NURSING NOTE
Patient is calm and cooperative with care. Patient denies all psych s/s. Medication compliant. Patient is visible in the dayroom sociable with peers. Patient is able to make needs known. Safety checks ongoing.

## 2024-05-13 PROCEDURE — 99232 SBSQ HOSP IP/OBS MODERATE 35: CPT | Performed by: STUDENT IN AN ORGANIZED HEALTH CARE EDUCATION/TRAINING PROGRAM

## 2024-05-13 RX ORDER — GABAPENTIN 100 MG/1
100 CAPSULE ORAL 3 TIMES DAILY
Status: DISCONTINUED | OUTPATIENT
Start: 2024-05-13 | End: 2024-05-14

## 2024-05-13 RX ADMIN — ARIPIPRAZOLE 7 MG: 5 TABLET ORAL at 08:00

## 2024-05-13 RX ADMIN — NALTREXONE HYDROCHLORIDE 50 MG: 50 TABLET, FILM COATED ORAL at 08:01

## 2024-05-13 RX ADMIN — CYANOCOBALAMIN TAB 1000 MCG 1000 MCG: 1000 TAB at 08:01

## 2024-05-13 RX ADMIN — NICOTINE POLACRILEX 2 MG: 2 GUM, CHEWING ORAL at 18:01

## 2024-05-13 RX ADMIN — FOLIC ACID 1 MG: 1 TABLET ORAL at 08:01

## 2024-05-13 RX ADMIN — NICOTINE POLACRILEX 2 MG: 2 GUM, CHEWING ORAL at 15:49

## 2024-05-13 RX ADMIN — NICOTINE 14 MG: 14 PATCH, EXTENDED RELEASE TRANSDERMAL at 08:01

## 2024-05-13 RX ADMIN — NICOTINE POLACRILEX 2 MG: 2 GUM, CHEWING ORAL at 10:45

## 2024-05-13 RX ADMIN — MULTIPLE VITAMINS W/ MINERALS TAB 1 TABLET: TAB ORAL at 08:00

## 2024-05-13 RX ADMIN — NICOTINE POLACRILEX 2 MG: 2 GUM, CHEWING ORAL at 08:44

## 2024-05-13 RX ADMIN — ATORVASTATIN CALCIUM 10 MG: 10 TABLET, FILM COATED ORAL at 16:37

## 2024-05-13 RX ADMIN — AMLODIPINE BESYLATE 5 MG: 5 TABLET ORAL at 08:01

## 2024-05-13 RX ADMIN — GABAPENTIN 100 MG: 100 CAPSULE ORAL at 11:32

## 2024-05-13 RX ADMIN — GABAPENTIN 100 MG: 100 CAPSULE ORAL at 21:24

## 2024-05-13 RX ADMIN — GABAPENTIN 100 MG: 100 CAPSULE ORAL at 16:37

## 2024-05-13 RX ADMIN — THIAMINE HCL TAB 100 MG 100 MG: 100 TAB at 08:01

## 2024-05-13 RX ADMIN — TRAZODONE HYDROCHLORIDE 50 MG: 50 TABLET ORAL at 21:24

## 2024-05-13 RX ADMIN — ESCITALOPRAM OXALATE 20 MG: 10 TABLET ORAL at 08:00

## 2024-05-13 NOTE — NURSING NOTE
During my shift, the patient reported no pain and denied any symptoms. Patient took all night medication and resting quietly in her room.    Spoke with pt. Assisted pt with rescheduling appt due to Dr. Bond having surgery. Pt verbalized understanding.

## 2024-05-13 NOTE — PROGRESS NOTES
Progress Note - Behavioral Health   Christy Borjas 63 y.o. female MRN: [id number]  Unit/Bed#: [facility] 646-02 Encounter: [id number]       Patient was visited on unit for continuing care; chart reviewed and discussed with multidisciplinary treatment team.  On approach, the patient was calm and cooperative. Denied any changes in mood, appetite, and energy level. The patient presented anxious about the family meeting in the morning, and also concerned about her freedom being limited getting back to her sister's house, and the urge to self medicate herself with alcohol in the community. No problem initiating and maintaining sleep.  Denied A/VH currently.  Continues to report intrusive thoughts and negative ruminations. Denied active SI/HI, intent or plan upon direct inquiry at this time. The patient consented for safety and agreed to verbalize any frustration or concerns to the nursing staff, immediately. However, the patient is unable to mention any protective factor or purpose in life and continues to be high risk for suicide in the community.    Patient continues to be visible in the milieu and interacts with staff and peers. No reports of aggression or self-injurious behavior on unit. No PRN medications used in the past 24 hours.    Patient accepted all offered medications and no adverse effects of medications noted or reported. Abilify uptitrated to 7 mg po daily on 5/11/24. Melatonin was held due to increased nightmares and maintained on Trazodone 50 mg nightly. The patient is being started on Neurontin 100 mg TID for anxiety and alcohol abuse; doses to be adjusted as indicated. I attended the family meeting with patient's sister (Irais), in the presence of the patient and Madai Watts LMSW at 11 AM. The patient benefits from referral to dual diagnosis services upon further psychiatric stabilization.    Current Mental Status Evaluation:  Appearance and attitude: appeared as stated age, casually dressed, with good  hygiene  Eye contact: good  Motor Function: within normal limits, intact gait, No PMA/PMR  Gait/station: normal gait/station and normal balance  Speech: normal for rate, rhythm, volume, latency, amount  Language: No overt abnormality  Mood/affect: depressed, anxious / Affect was constricted but reactive, mood congruent, tearful  Thought Processes: ruminating  Thought content: denied suicidal ideations or homicidal ideations, no overt delusions elicited, negative thinking, intrusive thoughts, ruminations  Associations: intact associations  Perceptual disturbances: denies Auditory/Visual/Tactile Hallucinations  Orientation: oriented to time, person, place and to the situational context  Cognitive Function: intact  Memory: recent and remote memory grossly intact  Intellect: average  Fund of knowledge: aware of current events, aware of past history, and vocabulary average  Impulse control: good  Insight/judgment: fair/fair      Vital signs in last 24 hours:    Temp:  [96.7 °F (35.9 °C)-98.5 °F (36.9 °C)] 98.5 °F (36.9 °C)  HR:  [72-83] 75  Resp:  [16-20] 20  BP: (123-143)/(77-95) 123/77    Laboratory results: I have personally reviewed all pertinent laboratory/tests results    Results from the past 24 hours: No results found for this or any previous visit (from the past 24 hour(s)).    Progress Toward Goals: slight improvement    Assessment:  Principal Problem:    Severe episode of recurrent major depressive disorder, without psychotic features (HCC)  Active Problems:    Alcohol use disorder    Medical clearance for psychiatric admission    Tobacco use    Dyslipidemia    Hypertension        Plan:  - f/u [facility] recs regarding the medical problems   - Continue medication titration and treatment plan; adjust medication to optimize treatment response and as clinically indicated. .     Scheduled medications:  Current Facility-Administered Medications   Medication Dose Route Frequency Provider Last Rate    acetaminophen  650 mg  Oral Q6H PRN Sasha Marie MD      acetaminophen  650 mg Oral Q4H PRN Sasha Marie MD      acetaminophen  975 mg Oral Q6H PRN Sasha Marie MD      aluminum-magnesium hydroxide-simethicone  30 mL Oral Q4H PRN Sasha Marie MD      amLODIPine  5 mg Oral Daily Nellie Johnson, CRNP      ARIPiprazole  7 mg Oral Daily Levar Lira MD      atorvastatin  10 mg Oral Daily With Dinner Nellie Johnson, CRNP      haloperidol lactate  2.5 mg Intramuscular Q4H PRN Max 4/day Sasha Marie MD      And    LORazepam  1 mg Intramuscular Q4H PRN Max 4/day Sasha Marie MD      And    benztropine  0.5 mg Intramuscular Q4H PRN Max 4/day Sasha Marie MD      haloperidol lactate  5 mg Intramuscular Q4H PRN Max 4/day Sasha Marie MD      And    LORazepam  2 mg Intramuscular Q4H PRN Max 4/day Sasha Marie MD      And    benztropine  1 mg Intramuscular Q4H PRN Max 4/day Sasha Marie MD      benztropine  1 mg Oral Q4H PRN Max 6/day Sasha Marie MD      bisacodyl  10 mg Rectal Daily PRN Sasha Marie MD      cyanocobalamin  1,000 mcg Oral Daily Nellie Johnson, CRNP      hydrOXYzine HCL  50 mg Oral Q6H PRN Max 4/day Sasha Marie MD      Or    diphenhydrAMINE  50 mg Intramuscular Q6H PRN Sasha Marie MD      ergocalciferol  50,000 Units Oral Weekly Nellie Johnson, CRNP      escitalopram  20 mg Oral Daily Levar Lira MD      folic acid  1 mg Oral Daily Levar Lira MD      gabapentin  100 mg Oral TID Levar Lira MD      haloperidol  1 mg Oral Q6H PRN Sasha Marie MD      haloperidol  2.5 mg Oral Q4H PRN Max 4/day Sasha Marie MD      haloperidol  5 mg Oral Q4H PRN Max 4/day Sasha Marie MD      hydrOXYzine HCL  100 mg Oral Q6H PRN Max 4/day Sasha Marie MD      Or    LORazepam  2 mg Intramuscular Q6H PRN Sasha Marie MD      hydrOXYzine HCL  25 mg Oral Q6H PRN Max 4/day Sasha Marie MD      mirtazapine  7.5 mg  Oral HS PRN Levar Lira MD      multivitamin-minerals  1 tablet Oral Daily Levar Lira MD      naltrexone  50 mg Oral Daily Levar Lira MD      nicotine  14 mg Transdermal Daily SHILOH Bruno      nicotine polacrilex  2 mg Oral Q2H PRN SHILOH Bruno      ondansetron  4 mg Oral Q8H PRN SHILOH Bruno      polyethylene glycol  17 g Oral Daily PRN Sasha Marie MD      senna-docusate sodium  1 tablet Oral Daily PRN Sasha Marie MD      Thiamine Mononitrate  100 mg Oral Daily Levar Lira MD      traZODone  50 mg Oral HS Levar Lira MD          PRN:    acetaminophen    acetaminophen    acetaminophen    aluminum-magnesium hydroxide-simethicone    haloperidol lactate **AND** LORazepam **AND** benztropine    haloperidol lactate **AND** LORazepam **AND** benztropine    benztropine    bisacodyl    hydrOXYzine HCL **OR** diphenhydrAMINE    haloperidol    haloperidol    haloperidol    hydrOXYzine HCL **OR** LORazepam    hydrOXYzine HCL    mirtazapine    nicotine polacrilex    ondansetron    polyethylene glycol    senna-docusate sodium    - Observation: routine            - VS: as per unit protocol  - Diet: Regular diet     - Psychoeducation (benefits and potential risks) discussed, importance of compliance with the psychiatric treatment reiterated, and the patient verbalized understanding of the matter  - Encourage group attendance and milieu therapy      - The pt was educated and agreed to verbalize any suicidal thoughts, frustrations or concerns to the nursing staff, immediately.   - Dispo: To be determined       Next of Kin  Extended Emergency Contact Information  Primary Emergency Contact: Irais Moncada  Mobile Phone: [phone]  Relation: Sister      Counseling / Coordination of Care  Patient's progress discussed with staff in treatment team meeting.  Medications, treatment progress and treatment plan reviewed with patient.  Medication changes discussed  with patient.  Medication education provided to patient.  Supportive therapy provided to patient.  Cognitive techniques utilized during the session.  Reassurance and supportive therapy provided.  Reoriented to reality and reassured.  Encouraged participation in milieu and group therapy on the unit.  Crisis/safety plan discussed with patient.  Discharge plan discussed with patient.       Levar Lira MD  Attending Psychiatrist   [facility]       This note was completed in part utilizing Dragon dictation Software. Grammatical, translation, syntax errors, random word insertions, spelling mistakes, and incomplete sentences may be an occasional consequence of this system secondary to software limitations with voice recognition, ambient noise, and hardware issues. If you have any questions or concerns about the content, text, or information contained within the body of this dictation, please contact the provider for clarification.

## 2024-05-13 NOTE — PROGRESS NOTES
05/13/24 0815   Team Meeting   Meeting Type Daily Rounds   Initial Conference Date 05/13/24   Next Conference Date 05/14/24   Team Members Present   Team Members Present Physician;Nurse;;   Physician Team Member Dr Lira, Dr. Solomon, TOREY Connor   Nursing Team Member Juany Garcia   Care Management Team Member Madai   Social Work Team Member Alison   Patient/Family Present   Patient Present No   Patient's Family Present No     Add Jess - Pharmacy: Family meeting today at 11 am, discharge pending for Thursday, pleasant, deny s/s.

## 2024-05-13 NOTE — NURSING NOTE
Pt with good appetite at breakfast, refused lunch but accepted po fluids.  VSS.  Med-compliant.  Pt tearful briefly after speaking with Attending.  Attended group.  Denied SI.  Visit with sister went without incident.  Monitored for safety and support.

## 2024-05-13 NOTE — TREATMENT TEAM
Pt attended groups.  Incongruent and constricted/superficial.  Pt noted she was preoccupied after having meeting this am.      05/13/24 1330   Activity/Group Checklist   Group Other (Comment)  (community supports and info)   Attendance Attended   Attendance Duration (min) 46-60   Interactions Interacted appropriately   Affect/Mood Appropriate   Goals Achieved Identified feelings;Identified relapse prevention strategies;Able to listen to others;Able to engage in interactions;Able to reflect/comment on own behavior;Able to manage/cope with feelings;Verbalized increased hopefulness;Able to self-disclose;Able to recieve feedback

## 2024-05-13 NOTE — PLAN OF CARE
Problem: Alteration in Thoughts and Perception  Goal: Treatment Goal: Gain control of psychotic behaviors/thinking, reduce/eliminate presenting symptoms and demonstrate improved reality functioning upon discharge  Outcome: Progressing  Goal: Verbalize thoughts and feelings  Description: Interventions:  - Promote a nonjudgmental and trusting relationship with the patient through active listening and therapeutic communication  - Assess patient's level of functioning, behavior and potential for risk  - Engage patient in 1 on 1 interactions  - Encourage patient to express fears, feelings, frustrations, and discuss symptoms    - [place] patient to reality, help patient recognize reality-based thinking   - Administer medications as ordered and assess for potential side effects  - Provide the patient education related to the signs and symptoms of the illness and desired effects of prescribed medications  Outcome: Progressing  Goal: Refrain from acting on delusional thinking/internal stimuli  Description: Interventions:  - Monitor patient closely, per order   - Utilize least restrictive measures   - Set reasonable limits, give positive feedback for acceptable   - Administer medications as ordered and monitor of potential side effects  Outcome: Progressing  Goal: Agree to be compliant with medication regime, as prescribed and report medication side effects  Description: Interventions:  - Offer appropriate PRN medication and supervise ingestion; conduct AIMS, as needed   Outcome: Progressing  Goal: Recognize dysfunctional thoughts, communicate reality-based thoughts at the time of discharge  Description: Interventions:  - Provide medication and psycho-education to assist patient in compliance and developing insight into his/her illness   Outcome: Progressing  Goal: Complete daily ADLs, including personal hygiene independently, as able  Description: Interventions:  - Observe, teach, and assist patient with ADLS  - Monitor and  promote a balance of rest/activity, with adequate nutrition and elimination   Outcome: Progressing     Problem: Risk for Self Injury/Neglect  Goal: Treatment Goal: Remain safe during length of stay, learn and adopt new coping skills, and be free of self-injurious ideation, impulses and acts at the time of discharge  Outcome: Progressing  Goal: Verbalize thoughts and feelings  Description: Interventions:  - Assess and re-assess patient's lethality and potential for self-injury  - Engage patient in 1:1 interactions, daily, for a minimum of 15 minutes  - Encourage patient to express feelings, fears, frustrations, hopes  - Establish rapport/trust with patient   Outcome: Progressing  Goal: Refrain from harming self  Description: Interventions:  - Monitor patient closely, per order  - Develop a trusting relationship  - Supervise medication ingestion, monitor effects and side effects   Outcome: Progressing  Goal: Attend and participate in unit activities, including therapeutic, recreational, and educational groups  Description: Interventions:  - Provide therapeutic and educational activities daily, encourage attendance and participation, and document same in the medical record  - Obtain collateral information, encourage visitation and family involvement in care   Outcome: Progressing  Goal: Recognize maladaptive responses and adopt new coping mechanisms  Outcome: Progressing  Goal: Complete daily ADLs, including personal hygiene independently, as able  Description: Interventions:  - Observe, teach, and assist patient with ADLS  - Monitor and promote a balance of rest/activity, with adequate nutrition and elimination  Outcome: Progressing     Problem: Alteration in Orientation  Goal: Treatment Goal: Demonstrate a reduction of confusion and improved orientation to person, place, time and/or situation upon discharge, according to optimum baseline/ability  Outcome: Progressing  Goal: Interact with staff daily  Description:  Interventions:  - Assess and re-assess patient's level of orientation  - Engage patient in 1 on 1 interactions, daily, for a minimum of 15 minutes   - Establish rapport/trust with patient   Outcome: Progressing

## 2024-05-14 PROCEDURE — 99232 SBSQ HOSP IP/OBS MODERATE 35: CPT | Performed by: STUDENT IN AN ORGANIZED HEALTH CARE EDUCATION/TRAINING PROGRAM

## 2024-05-14 RX ORDER — GABAPENTIN 300 MG/1
300 CAPSULE ORAL 3 TIMES DAILY
Status: DISCONTINUED | OUTPATIENT
Start: 2024-05-14 | End: 2024-05-16 | Stop reason: HOSPADM

## 2024-05-14 RX ADMIN — MULTIPLE VITAMINS W/ MINERALS TAB 1 TABLET: TAB ORAL at 08:26

## 2024-05-14 RX ADMIN — NALTREXONE HYDROCHLORIDE 50 MG: 50 TABLET, FILM COATED ORAL at 08:26

## 2024-05-14 RX ADMIN — GABAPENTIN 100 MG: 100 CAPSULE ORAL at 08:26

## 2024-05-14 RX ADMIN — NICOTINE POLACRILEX 2 MG: 2 GUM, CHEWING ORAL at 17:00

## 2024-05-14 RX ADMIN — THIAMINE HCL TAB 100 MG 100 MG: 100 TAB at 08:27

## 2024-05-14 RX ADMIN — ATORVASTATIN CALCIUM 10 MG: 10 TABLET, FILM COATED ORAL at 16:46

## 2024-05-14 RX ADMIN — ARIPIPRAZOLE 7 MG: 5 TABLET ORAL at 08:26

## 2024-05-14 RX ADMIN — NICOTINE POLACRILEX 2 MG: 2 GUM, CHEWING ORAL at 08:25

## 2024-05-14 RX ADMIN — TRAZODONE HYDROCHLORIDE 50 MG: 50 TABLET ORAL at 21:13

## 2024-05-14 RX ADMIN — CYANOCOBALAMIN TAB 1000 MCG 1000 MCG: 1000 TAB at 08:26

## 2024-05-14 RX ADMIN — NICOTINE POLACRILEX 2 MG: 2 GUM, CHEWING ORAL at 20:01

## 2024-05-14 RX ADMIN — FOLIC ACID 1 MG: 1 TABLET ORAL at 08:26

## 2024-05-14 RX ADMIN — NICOTINE 14 MG: 14 PATCH, EXTENDED RELEASE TRANSDERMAL at 08:27

## 2024-05-14 RX ADMIN — NICOTINE POLACRILEX 2 MG: 2 GUM, CHEWING ORAL at 12:37

## 2024-05-14 RX ADMIN — ESCITALOPRAM OXALATE 20 MG: 10 TABLET ORAL at 08:26

## 2024-05-14 RX ADMIN — AMLODIPINE BESYLATE 5 MG: 5 TABLET ORAL at 08:26

## 2024-05-14 RX ADMIN — GABAPENTIN 300 MG: 300 CAPSULE ORAL at 21:13

## 2024-05-14 RX ADMIN — GABAPENTIN 300 MG: 300 CAPSULE ORAL at 16:46

## 2024-05-14 NOTE — PROGRESS NOTES
05/14/24 0815   Team Meeting   Meeting Type Daily Rounds   Initial Conference Date 05/14/24   Next Conference Date 05/15/24   Team Members Present   Team Members Present Physician;Nurse;   Physician Team Member Dr Lira, Dr Solomon, Dr Mariee, TOREY Persaud   Nursing Team Member Radha Harrington   Care Management Team Member Madai   Patient/Family Present   Patient Present No   Patient's Family Present No     Tearful after her visit, refused to come out for lunch, doing better in evening, discharge Thursday.

## 2024-05-14 NOTE — NURSING NOTE
Pt pleasant and med-compliant with good appetite and steady gait.  Denied SI.  Pt attended group and social with peers.  VSS.  Monitored for safety and support.

## 2024-05-14 NOTE — PLAN OF CARE
Problem: Alteration in Thoughts and Perception  Goal: Treatment Goal: Gain control of psychotic behaviors/thinking, reduce/eliminate presenting symptoms and demonstrate improved reality functioning upon discharge  Outcome: Progressing  Goal: Verbalize thoughts and feelings  Description: Interventions:  - Promote a nonjudgmental and trusting relationship with the patient through active listening and therapeutic communication  - Assess patient's level of functioning, behavior and potential for risk  - Engage patient in 1 on 1 interactions  - Encourage patient to express fears, feelings, frustrations, and discuss symptoms    - [place] patient to reality, help patient recognize reality-based thinking   - Administer medications as ordered and assess for potential side effects  - Provide the patient education related to the signs and symptoms of the illness and desired effects of prescribed medications  Outcome: Progressing  Goal: Refrain from acting on delusional thinking/internal stimuli  Description: Interventions:  - Monitor patient closely, per order   - Utilize least restrictive measures   - Set reasonable limits, give positive feedback for acceptable   - Administer medications as ordered and monitor of potential side effects  Outcome: Progressing  Goal: Agree to be compliant with medication regime, as prescribed and report medication side effects  Description: Interventions:  - Offer appropriate PRN medication and supervise ingestion; conduct AIMS, as needed   Outcome: Progressing  Goal: Recognize dysfunctional thoughts, communicate reality-based thoughts at the time of discharge  Description: Interventions:  - Provide medication and psycho-education to assist patient in compliance and developing insight into his/her illness   Outcome: Progressing  Goal: Complete daily ADLs, including personal hygiene independently, as able  Description: Interventions:  - Observe, teach, and assist patient with ADLS  - Monitor and  promote a balance of rest/activity, with adequate nutrition and elimination   Outcome: Progressing     Problem: Risk for Self Injury/Neglect  Goal: Treatment Goal: Remain safe during length of stay, learn and adopt new coping skills, and be free of self-injurious ideation, impulses and acts at the time of discharge  Outcome: Progressing  Goal: Verbalize thoughts and feelings  Description: Interventions:  - Assess and re-assess patient's lethality and potential for self-injury  - Engage patient in 1:1 interactions, daily, for a minimum of 15 minutes  - Encourage patient to express feelings, fears, frustrations, hopes  - Establish rapport/trust with patient   Outcome: Progressing  Goal: Refrain from harming self  Description: Interventions:  - Monitor patient closely, per order  - Develop a trusting relationship  - Supervise medication ingestion, monitor effects and side effects   Outcome: Progressing  Goal: Recognize maladaptive responses and adopt new coping mechanisms  Outcome: Progressing  Goal: Complete daily ADLs, including personal hygiene independently, as able  Description: Interventions:  - Observe, teach, and assist patient with ADLS  - Monitor and promote a balance of rest/activity, with adequate nutrition and elimination  Outcome: Progressing     Problem: Anxiety  Goal: Anxiety is at manageable level  Description: Interventions:  - Assess and monitor patient's anxiety level.   - Monitor for signs and symptoms (heart palpitations, chest pain, shortness of breath, headaches, nausea, feeling jumpy, restlessness, irritable, apprehensive).   - Collaborate with interdisciplinary team and initiate plan and interventions as ordered.  - [place] patient to unit/surroundings  - Explain treatment plan  - Encourage participation in care  - Encourage verbalization of concerns/fears  - Identify coping mechanisms  - Assist in developing anxiety-reducing skills  - Administer/offer alternative therapies  - Limit or  eliminate stimulants  Outcome: Progressing     Problem: Alteration in Orientation  Goal: Treatment Goal: Demonstrate a reduction of confusion and improved orientation to person, place, time and/or situation upon discharge, according to optimum baseline/ability  Outcome: Progressing  Goal: Interact with staff daily  Description: Interventions:  - Assess and re-assess patient's level of orientation  - Engage patient in 1 on 1 interactions, daily, for a minimum of 15 minutes   - Establish rapport/trust with patient   Outcome: Progressing

## 2024-05-14 NOTE — NURSING NOTE
"Patient visible reading or with other peers. Patient denies SI/HI/AH/VH. Reports mood as \"great\". Patient appetite intact. Med complaint. VSS. Denies unmet needs at this time. Nicorette gum given PRN. Continual safety checks ongoing  "

## 2024-05-14 NOTE — PROGRESS NOTES
Progress Note - Behavioral Health   Christy Borjas 63 y.o. female MRN: [id number]  Unit/Bed#: [facility] 646-02 Encounter: [id number]       Patient was visited on unit for continuing care; chart reviewed and discussed with multidisciplinary treatment team.  On approach, the patient was calm and cooperative. Endorsed better mood and less anxiety sxs, but continues to have negative thinking and ruminating thoughts. Remains preoccupied with her stressors, and gets anxious while ruminating, but tries to use distraction techniques. Denied any changes in appetite, and energy level. Denied flashbacks or nightmares. No problem initiating and maintaining sleep.  Denied A/VH currently.  Continues to report intrusive thoughts and negative ruminations mostly at night, but endorsed better control. Denied active SI/HI, intent or plan upon direct inquiry at this time. The patient consented for safety and agreed to verbalize any frustration or concerns to the nursing staff, immediately.    Patient continues to be visible in the milieu and interacts with staff and peers. No reports of aggression or self-injurious behavior on unit. No PRN medications used in the past 24 hours.    Patient accepted all offered medications and no adverse effects of medications noted or reported.  Abilify uptitrated to 7 mg po daily on 5/11/24. Melatonin was held due to increased nightmares and maintained on Trazodone 50 mg nightly. The patient was started on Neurontin 100 mg TID for anxiety and alcohol abuse and the dose increased to 300 mg TID today; doses to be adjusted as indicated. The patient benefits from dual diagnosis services and individual psychotherapy upon further psychiatric stabilization.    Current Mental Status Evaluation:  Appearance and attitude: appeared as stated age, casually dressed, with good hygiene  Eye contact: good  Motor Function: within normal limits, intact gait, No PMA/PMR  Gait/station: normal gait/station and normal  balance  Speech: normal for rate, rhythm, volume, latency, amount  Language: No overt abnormality  Mood/affect: less anxious, less depressed / Affect was constricted but reactive, mood congruent, briefly tearful during the interview  Thought Processes: ruminating  Thought content: denied suicidal ideations or homicidal ideations, no overt delusions elicited, negative thinking, ruminations  Associations: intact associations  Perceptual disturbances: denies Auditory/Visual/Tactile Hallucinations  Orientation: oriented to time, person, place and to the situational context  Cognitive Function: intact  Memory: recent and remote memory grossly intact  Intellect: average  Fund of knowledge: aware of current events, aware of past history, and vocabulary average  Impulse control: good  Insight/judgment: fair/fair    Vital signs in last 24 hours:    Temp:  [97.1 °F (36.2 °C)-97.8 °F (36.6 °C)] 97.1 °F (36.2 °C)  HR:  [] 114  Resp:  [16-18] 16  BP: (123-130)/(74-84) 123/76    Laboratory results: I have personally reviewed all pertinent laboratory/tests results    Results from the past 24 hours: No results found for this or any previous visit (from the past 24 hour(s)).    Progress Toward Goals: making gradual improvement    Assessment:  Principal Problem:    Severe episode of recurrent major depressive disorder, without psychotic features (HCC)  Active Problems:    Alcohol use disorder    Medical clearance for psychiatric admission    Tobacco use    Dyslipidemia    Hypertension        Plan:  - f/u [facility] recs regarding the medical problems   - Continue medication titration and treatment plan; adjust medication to optimize treatment response and as clinically indicated. .     Scheduled medications:  Current Facility-Administered Medications   Medication Dose Route Frequency Provider Last Rate    acetaminophen  650 mg Oral Q6H PRN Sasha Marie MD      acetaminophen  650 mg Oral Q4H PRN Sasha Marie MD       acetaminophen  975 mg Oral Q6H PRN Sasha Marie MD      aluminum-magnesium hydroxide-simethicone  30 mL Oral Q4H PRN Sasha Marie MD      amLODIPine  5 mg Oral Daily Nellie Bondsluna, CRJOYCE      ARIPiprazole  7 mg Oral Daily Levar Lira MD      atorvastatin  10 mg Oral Daily With Dinner Nellie Matthewsrakesh, CRNP      haloperidol lactate  2.5 mg Intramuscular Q4H PRN Max 4/day Sasha Marie MD      And    LORazepam  1 mg Intramuscular Q4H PRN Max 4/day Sasha Marie MD      And    benztropine  0.5 mg Intramuscular Q4H PRN Max 4/day Sasha Marie MD      haloperidol lactate  5 mg Intramuscular Q4H PRN Max 4/day Sasha Marie MD      And    LORazepam  2 mg Intramuscular Q4H PRN Max 4/day Sasha Marie MD      And    benztropine  1 mg Intramuscular Q4H PRN Max 4/day Sasha Marie MD      benztropine  1 mg Oral Q4H PRN Max 6/day Sasha Marie MD      bisacodyl  10 mg Rectal Daily PRN Sasha Marie MD      cyanocobalamin  1,000 mcg Oral Daily Nellie Matthewsrakesh, SHILOH      hydrOXYzine HCL  50 mg Oral Q6H PRN Max 4/day Sasha Marie MD      Or    diphenhydrAMINE  50 mg Intramuscular Q6H PRN Ssaha Marie MD      ergocalciferol  50,000 Units Oral Weekly Nellie Matthewsrakesh, SHILOH      escitalopram  20 mg Oral Daily Levar Lira MD      folic acid  1 mg Oral Daily Levar Lira MD      gabapentin  300 mg Oral TID Levar Lira MD      haloperidol  1 mg Oral Q6H PRN Sasha Marie MD      haloperidol  2.5 mg Oral Q4H PRN Max 4/day Sasha Marie MD      haloperidol  5 mg Oral Q4H PRN Max 4/day Sasha Marie MD      hydrOXYzine HCL  100 mg Oral Q6H PRN Max 4/day Sasha Marie MD      Or    LORazepam  2 mg Intramuscular Q6H PRN Sasha Marie MD      hydrOXYzine HCL  25 mg Oral Q6H PRN Max 4/day Sasha Marie MD      mirtazapine  7.5 mg Oral HS PRN Levar Lira MD      multivitamin-minerals  1 tablet Oral Daily Levar Lira MD       naltrexone  50 mg Oral Daily Levar Lira MD      nicotine  14 mg Transdermal Daily SHILOH Bruno      nicotine polacrilex  2 mg Oral Q2H PRN SHILOH Bruno      ondansetron  4 mg Oral Q8H PRN SHILOH Bruno      polyethylene glycol  17 g Oral Daily PRN Sasha Marie MD      senna-docusate sodium  1 tablet Oral Daily PRN Sasha Marie MD      Thiamine Mononitrate  100 mg Oral Daily Levar Lira MD      traZODone  50 mg Oral HS Levar Lira MD          PRN:    acetaminophen    acetaminophen    acetaminophen    aluminum-magnesium hydroxide-simethicone    haloperidol lactate **AND** LORazepam **AND** benztropine    haloperidol lactate **AND** LORazepam **AND** benztropine    benztropine    bisacodyl    hydrOXYzine HCL **OR** diphenhydrAMINE    haloperidol    haloperidol    haloperidol    hydrOXYzine HCL **OR** LORazepam    hydrOXYzine HCL    mirtazapine    nicotine polacrilex    ondansetron    polyethylene glycol    senna-docusate sodium    - Observation: routine            - VS: as per unit protocol  - Diet: Regular diet     - Psychoeducation (benefits and potential risks) discussed, importance of compliance with the psychiatric treatment reiterated, and the patient verbalized understanding of the matter  - Encourage group attendance and milieu therapy      - The pt was educated and agreed to verbalize any suicidal thoughts, frustrations or concerns to the nursing staff, immediately.   - Dispo: To be determined       Next of Kin  Extended Emergency Contact Information  Primary Emergency Contact: Irais Moncada  Mobile Phone: [phone]  Relation: Sister      Counseling / Coordination of Care  Patient's progress discussed with staff in treatment team meeting.  Medications, treatment progress and treatment plan reviewed with patient.  Medication changes discussed with patient.  Medication education provided to patient.  Coping with anxiety, relationship issues,  and stress reviewed with patient.  Coping skills reviewed with patient.  Importance of medication and treatment compliance reviewed with patient.  Importance of follow up for substance abuse issues discussed with patient.  Supportive therapy provided to patient.  Cognitive techniques utilized during the session.  Reassurance and supportive therapy provided.  Reoriented to reality and reassured.  Encouraged participation in milieu and group therapy on the unit.  Crisis/safety plan discussed with patient.  Discharge plan discussed with patient.       Levar Lira MD  Attending Psychiatrist   [facility]       This note was completed in part utilizing Dragon dictation Software. Grammatical, translation, syntax errors, random word insertions, spelling mistakes, and incomplete sentences may be an occasional consequence of this system secondary to software limitations with voice recognition, ambient noise, and hardware issues. If you have any questions or concerns about the content, text, or information contained within the body of this dictation, please contact the provider for clarification.

## 2024-05-14 NOTE — NURSING NOTE
Patient is pleasant, calm and cooperative with care. Denies all Psych s/s. Medication compliant. Patient is visible watching television with peers. Patient is able to make needs known. Safety checks ongoing.

## 2024-05-15 PROBLEM — Z00.8 MEDICAL CLEARANCE FOR PSYCHIATRIC ADMISSION: Status: RESOLVED | Noted: 2024-05-03 | Resolved: 2024-05-15

## 2024-05-15 PROCEDURE — 99232 SBSQ HOSP IP/OBS MODERATE 35: CPT | Performed by: STUDENT IN AN ORGANIZED HEALTH CARE EDUCATION/TRAINING PROGRAM

## 2024-05-15 RX ORDER — ATORVASTATIN CALCIUM 10 MG/1
10 TABLET, FILM COATED ORAL
Qty: 30 TABLET | Refills: 1 | Status: SHIPPED | OUTPATIENT
Start: 2024-05-15 | End: 2024-07-14

## 2024-05-15 RX ORDER — ARIPIPRAZOLE 2 MG/1
TABLET ORAL
Qty: 30 TABLET | Refills: 1 | Status: SHIPPED | OUTPATIENT
Start: 2024-05-15

## 2024-05-15 RX ORDER — THIAMINE MONONITRATE (VIT B1) 100 MG
100 TABLET ORAL DAILY
Qty: 30 TABLET | Refills: 1 | Status: SHIPPED | OUTPATIENT
Start: 2024-05-16 | End: 2024-07-15

## 2024-05-15 RX ORDER — FOLIC ACID 1 MG/1
1 TABLET ORAL DAILY
Qty: 30 TABLET | Refills: 1 | Status: SHIPPED | OUTPATIENT
Start: 2024-05-16 | End: 2024-07-15

## 2024-05-15 RX ORDER — ARIPIPRAZOLE 5 MG/1
TABLET ORAL
Qty: 30 TABLET | Refills: 1 | Status: SHIPPED | OUTPATIENT
Start: 2024-05-15

## 2024-05-15 RX ORDER — ERGOCALCIFEROL 1.25 MG/1
50000 CAPSULE ORAL WEEKLY
Qty: 6 CAPSULE | Refills: 0 | Status: SHIPPED | OUTPATIENT
Start: 2024-05-17 | End: 2024-06-22

## 2024-05-15 RX ORDER — NALTREXONE HYDROCHLORIDE 50 MG/1
50 TABLET, FILM COATED ORAL DAILY
Qty: 30 TABLET | Refills: 1 | Status: SHIPPED | OUTPATIENT
Start: 2024-05-16 | End: 2024-07-15

## 2024-05-15 RX ORDER — ESCITALOPRAM OXALATE 20 MG/1
20 TABLET ORAL DAILY
Qty: 30 TABLET | Refills: 1 | Status: SHIPPED | OUTPATIENT
Start: 2024-05-16 | End: 2024-07-15

## 2024-05-15 RX ORDER — TRAZODONE HYDROCHLORIDE 50 MG/1
50 TABLET ORAL
Qty: 30 TABLET | Refills: 1 | Status: SHIPPED | OUTPATIENT
Start: 2024-05-15 | End: 2024-07-14

## 2024-05-15 RX ORDER — AMLODIPINE BESYLATE 5 MG/1
5 TABLET ORAL DAILY
Qty: 30 TABLET | Refills: 1 | Status: SHIPPED | OUTPATIENT
Start: 2024-05-16 | End: 2024-07-15

## 2024-05-15 RX ORDER — GABAPENTIN 300 MG/1
300 CAPSULE ORAL 3 TIMES DAILY
Qty: 90 CAPSULE | Refills: 1 | Status: SHIPPED | OUTPATIENT
Start: 2024-05-15 | End: 2024-07-14

## 2024-05-15 RX ADMIN — FOLIC ACID 1 MG: 1 TABLET ORAL at 08:21

## 2024-05-15 RX ADMIN — CYANOCOBALAMIN TAB 1000 MCG 1000 MCG: 1000 TAB at 08:21

## 2024-05-15 RX ADMIN — NICOTINE POLACRILEX 2 MG: 2 GUM, CHEWING ORAL at 12:43

## 2024-05-15 RX ADMIN — TRAZODONE HYDROCHLORIDE 50 MG: 50 TABLET ORAL at 21:15

## 2024-05-15 RX ADMIN — ARIPIPRAZOLE 7 MG: 5 TABLET ORAL at 08:21

## 2024-05-15 RX ADMIN — GABAPENTIN 300 MG: 300 CAPSULE ORAL at 08:21

## 2024-05-15 RX ADMIN — NALTREXONE HYDROCHLORIDE 50 MG: 50 TABLET, FILM COATED ORAL at 08:21

## 2024-05-15 RX ADMIN — ATORVASTATIN CALCIUM 10 MG: 10 TABLET, FILM COATED ORAL at 16:40

## 2024-05-15 RX ADMIN — NICOTINE POLACRILEX 2 MG: 2 GUM, CHEWING ORAL at 19:45

## 2024-05-15 RX ADMIN — THIAMINE HCL TAB 100 MG 100 MG: 100 TAB at 08:21

## 2024-05-15 RX ADMIN — GABAPENTIN 300 MG: 300 CAPSULE ORAL at 21:15

## 2024-05-15 RX ADMIN — MULTIPLE VITAMINS W/ MINERALS TAB 1 TABLET: TAB ORAL at 08:21

## 2024-05-15 RX ADMIN — NICOTINE POLACRILEX 2 MG: 2 GUM, CHEWING ORAL at 16:40

## 2024-05-15 RX ADMIN — ESCITALOPRAM OXALATE 20 MG: 10 TABLET ORAL at 08:21

## 2024-05-15 RX ADMIN — GABAPENTIN 300 MG: 300 CAPSULE ORAL at 16:40

## 2024-05-15 RX ADMIN — NICOTINE 14 MG: 14 PATCH, EXTENDED RELEASE TRANSDERMAL at 08:22

## 2024-05-15 RX ADMIN — NICOTINE POLACRILEX 2 MG: 2 GUM, CHEWING ORAL at 07:59

## 2024-05-15 NOTE — NURSING NOTE
Pt calm and pleasant visible in milieu and social with peers. Pt denies SI,HI, anxiety and depression. Pt cooperative with care and medication compliant.

## 2024-05-15 NOTE — CASE MANAGEMENT
Spoke to the patient's sister, Irais Moncada ([phone]), regarding discharge tomorrow. She advised that she was going to lock up her wine so the patient does not have access to it. She also has a family friend that goes to AA and wants her sister to go with her, will talk to the patient as she was resistant to AA.

## 2024-05-15 NOTE — PROGRESS NOTES
"Pt attended 2/3 groups.  Pt tearful in am and in afternoon reflected that she needs to continue with therapy after d/c to continue to work on her low self esteem.  Pt noted she is d/c tomorrow. Pt found the self appreciation \":difficult\" and focusing on her strengths.  Pt did indicate she felt good when she taught another peer how to do sudko.      05/15/24 1330   Activity/Group Checklist   Group Other (Comment)  (Self idenitity and communication)   Attendance Attended   Attendance Duration (min) 46-60   Interactions Interacted appropriately   Affect/Mood Appropriate   Goals Achieved Identified feelings;Identified relapse prevention strategies;Identified triggers;Discussed self-esteem issues;Discussed coping strategies;Able to listen to others;Able to engage in interactions;Able to reflect/comment on own behavior;Able to manage/cope with feelings;Able to recieve feedback;Able to self-disclose;Verbalized increased hopefulness         "

## 2024-05-15 NOTE — PLAN OF CARE
Problem: Alteration in Thoughts and Perception  Goal: Treatment Goal: Gain control of psychotic behaviors/thinking, reduce/eliminate presenting symptoms and demonstrate improved reality functioning upon discharge  Outcome: Progressing  Goal: Verbalize thoughts and feelings  Description: Interventions:  - Promote a nonjudgmental and trusting relationship with the patient through active listening and therapeutic communication  - Assess patient's level of functioning, behavior and potential for risk  - Engage patient in 1 on 1 interactions  - Encourage patient to express fears, feelings, frustrations, and discuss symptoms    - [place] patient to reality, help patient recognize reality-based thinking   - Administer medications as ordered and assess for potential side effects  - Provide the patient education related to the signs and symptoms of the illness and desired effects of prescribed medications  Outcome: Progressing  Goal: Refrain from acting on delusional thinking/internal stimuli  Description: Interventions:  - Monitor patient closely, per order   - Utilize least restrictive measures   - Set reasonable limits, give positive feedback for acceptable   - Administer medications as ordered and monitor of potential side effects  Outcome: Progressing  Goal: Agree to be compliant with medication regime, as prescribed and report medication side effects  Description: Interventions:  - Offer appropriate PRN medication and supervise ingestion; conduct AIMS, as needed   Outcome: Progressing     Problem: Risk for Self Injury/Neglect  Goal: Treatment Goal: Remain safe during length of stay, learn and adopt new coping skills, and be free of self-injurious ideation, impulses and acts at the time of discharge  Outcome: Progressing  Goal: Refrain from harming self  Description: Interventions:  - Monitor patient closely, per order  - Develop a trusting relationship  - Supervise medication ingestion, monitor effects and side  effects   Outcome: Progressing     Problem: Depression  Goal: Treatment Goal: Demonstrate behavioral control of depressive symptoms, verbalize feelings of improved mood/affect, and adopt new coping skills prior to discharge  Outcome: Progressing  Goal: Verbalize thoughts and feelings  Description: Interventions:  - Assess and re-assess patient's level of risk   - Engage patient in 1:1 interactions, daily, for a minimum of 15 minutes   - Encourage patient to express feelings, fears, frustrations, hopes   Outcome: Progressing  Goal: Refrain from harming self  Description: Interventions:  - Monitor patient closely, per order   - Supervise medication ingestion, monitor effects and side effects   Outcome: Progressing

## 2024-05-15 NOTE — PROGRESS NOTES
05/15/24 0813   Team Meeting   Meeting Type Daily Rounds   Initial Conference Date 05/15/24   Next Conference Date 05/16/24   Team Members Present   Team Members Present Physician;Nurse;;   Physician Team Member Dr Lira, Dr Mariee, TOREY Noland   Nursing Team Member Radha Harrington   Care Management Team Member Madai   Social Work Team Member Alison   Patient/Family Present   Patient Present No   Patient's Family Present No     Discharge tomorrow, anxious, gabapentin increased.

## 2024-05-15 NOTE — BH TRANSITION RECORD
Contact Information: If you have any questions, concerns, pended studies, tests and/or procedures, or emergencies regarding your inpatient behavioral health visit. Please contact [facility] [phone] and ask to speak to a , nurse or physician. A contact is available 24 hours/ 7 days a week at this number.     Summary of Procedures Performed During your Stay:  Below is a list of major procedures performed during your hospital stay and a summary of results:  - Cardiac Procedures/Studies: EKG on 5/3/24: NSR.    Pending Studies (From admission, onward)      None          Please follow up on the above pending studies with your PCP and/or referring provider.

## 2024-05-15 NOTE — NURSING NOTE
Pt pleasant and social with peers.  Attended group.  /76, Norvasc held at 0900.  Good appetite and steady gait.  Denied SI.  Monitored for safety and support.

## 2024-05-15 NOTE — PROGRESS NOTES
"  Progress Note - Behavioral Health   Christy Borjas 63 y.o. female MRN: [id number]  Unit/Bed#: [facility] 646-02 Encounter: [id number]       Patient was visited on unit for continuing care; chart reviewed and discussed with multidisciplinary treatment team.  On approach, the patient was calm and cooperative. Endorsed better mood and less anxiety sxs. No problem initiating and maintaining sleep.  Denied A/VH currently.  Denied SI/HI, intent or plan upon direct inquiry at this time. The patient appeared goal directed and future oriented, and looks forward to getting discharged back home tomorrow and f/u with outpatient services.    Patient continues to be visible in the milieu and interacts with staff and peers. No reports of aggression or self-injurious behavior on unit. No PRN medications used in the past 24 hours.    Patient accepted all offered medications and no adverse effects of medications noted or reported. Abilify uptitrated to 7 mg po daily on 5/11/24. Melatonin was held due to increased nightmares and maintained on Trazodone 50 mg nightly. The patient was started on Neurontin 100 mg TID for anxiety and alcohol abuse and the dose increased to 300 mg TID yesterday. The patient benefits from dual diagnosis services and individual psychotherapy in outpatient setting; tentative discharge tomorrow.    Current Mental Status Evaluation:  Appearance and attitude: appeared as stated age, casually dressed, with good hygiene  Eye contact: good  Motor Function: within normal limits, intact gait, No PMA/PMR  Gait/station: normal gait/station and normal balance  Speech: normal for rate, rhythm, volume, latency, amount  Language: No overt abnormality  Mood/affect: \"better\" / Affect was constricted but reactive, mood congruent, brighter  Thought Processes: sequential and goal-directed  Thought content: denies suicidal ideation or homicidal ideation; no delusions or first rank symptoms  Associations: intact " associations  Perceptual disturbances: denies Auditory/Visual/Tactile Hallucinations  Orientation: oriented to time, person, place and to the situational context  Cognitive Function: intact  Memory: recent and remote memory grossly intact  Intellect: average  Fund of knowledge: aware of current events, aware of past history, and vocabulary average  Impulse control: good  Insight/judgment: fair/fair    Vital signs in last 24 hours:    Temp:  [97.5 °F (36.4 °C)-98.4 °F (36.9 °C)] 97.5 °F (36.4 °C)  HR:  [71-80] 80  Resp:  [16-17] 17  BP: (107-130)/(71-76) 120/76    Laboratory results: I have personally reviewed all pertinent laboratory/tests results    Results from the past 24 hours: No results found for this or any previous visit (from the past 24 hour(s)).    Progress Toward Goals: improving    Assessment:  Principal Problem:    Severe episode of recurrent major depressive disorder, without psychotic features (HCC)  Active Problems:    Alcohol use disorder    Tobacco use    Dyslipidemia    Hypertension        Plan:  - f/u [facility] recs regarding the medical problems   - Continue medication titration and treatment plan; adjust medication to optimize treatment response and as clinically indicated. .     Scheduled medications:  Current Facility-Administered Medications   Medication Dose Route Frequency Provider Last Rate    acetaminophen  650 mg Oral Q6H PRN Sasha Marie MD      acetaminophen  650 mg Oral Q4H PRN Sasha Marie MD      acetaminophen  975 mg Oral Q6H PRN Sasha Marie MD      aluminum-magnesium hydroxide-simethicone  30 mL Oral Q4H PRN Sasha Marie MD      amLODIPine  5 mg Oral Daily SHILOH Bruno      ARIPiprazole  7 mg Oral Daily Levar Lira MD      atorvastatin  10 mg Oral Daily With Dinner SHILOH Bruno      haloperidol lactate  2.5 mg Intramuscular Q4H PRN Max 4/day Sasha Marie MD      And    LORazepam  1 mg Intramuscular Q4H PRN Max 4/day Sasha  NESTOR Marie MD      And    benztropine  0.5 mg Intramuscular Q4H PRN Max 4/day Sasha Marie MD      haloperidol lactate  5 mg Intramuscular Q4H PRN Max 4/day Sasha Marie MD      And    LORazepam  2 mg Intramuscular Q4H PRN Max 4/day Sasha Marie MD      And    benztropine  1 mg Intramuscular Q4H PRN Max 4/day Sasha Marie MD      benztropine  1 mg Oral Q4H PRN Max 6/day Sasha Marie MD      bisacodyl  10 mg Rectal Daily PRN Sasha Marie MD      cyanocobalamin  1,000 mcg Oral Daily SHILOH Bruno      hydrOXYzine HCL  50 mg Oral Q6H PRN Max 4/day Sasha Marie MD      Or    diphenhydrAMINE  50 mg Intramuscular Q6H PRN Sasha Marie MD      ergocalciferol  50,000 Units Oral Weekly SHILOH Bruon      escitalopram  20 mg Oral Daily Levar Lira MD      folic acid  1 mg Oral Daily Levar Lira MD      gabapentin  300 mg Oral TID Levar Lira MD      haloperidol  1 mg Oral Q6H PRN Sasha Marie MD      haloperidol  2.5 mg Oral Q4H PRN Max 4/day Sasha Marie MD      haloperidol  5 mg Oral Q4H PRN Max 4/day Sasha Marie MD      hydrOXYzine HCL  100 mg Oral Q6H PRN Max 4/day Sasha Marie MD      Or    LORazepam  2 mg Intramuscular Q6H PRN Sasha Marie MD      hydrOXYzine HCL  25 mg Oral Q6H PRN Max 4/day Sasha Marie MD      mirtazapine  7.5 mg Oral HS PRN Levar Lira MD      multivitamin-minerals  1 tablet Oral Daily Levar Lira MD      naltrexone  50 mg Oral Daily Levar Lira MD      nicotine  14 mg Transdermal Daily SHILOH Bruno      nicotine polacrilex  2 mg Oral Q2H PRN SHILOH Bruno      ondansetron  4 mg Oral Q8H PRN SHILOH Bruno      polyethylene glycol  17 g Oral Daily PRN Sasha Marie MD      senna-docusate sodium  1 tablet Oral Daily PRN Sasha Marie MD      Thiamine Mononitrate  100 mg Oral Daily Levar Lira MD      traZODone  50 mg Oral HS Lilliamr  MD Soraya          PRN:    acetaminophen    acetaminophen    acetaminophen    aluminum-magnesium hydroxide-simethicone    haloperidol lactate **AND** LORazepam **AND** benztropine    haloperidol lactate **AND** LORazepam **AND** benztropine    benztropine    bisacodyl    hydrOXYzine HCL **OR** diphenhydrAMINE    haloperidol    haloperidol    haloperidol    hydrOXYzine HCL **OR** LORazepam    hydrOXYzine HCL    mirtazapine    nicotine polacrilex    ondansetron    polyethylene glycol    senna-docusate sodium    - Observation: routine            - VS: as per unit protocol  - Diet: Regular diet     - Psychoeducation (benefits and potential risks) discussed, importance of compliance with the psychiatric treatment reiterated, and the patient verbalized understanding of the matter  - Encourage group attendance and milieu therapy      - The pt was educated and agreed to verbalize any suicidal thoughts, frustrations or concerns to the nursing staff, immediately.   - Dispo: Tentative discharge tomorrow      Next of Kin  Extended Emergency Contact Information  Primary Emergency Contact: Irais Moncada  Mobile Phone: [phone]  Relation: Sister      Counseling / Coordination of Care  Patient's progress discussed with staff in treatment team meeting.  Medications, treatment progress and treatment plan reviewed with patient.  Medication education provided to patient.  Coping strategies reviewed with patient.  Supportive therapy provided to patient.  Cognitive techniques utilized during the session.  Reassurance and supportive therapy provided.  Reoriented to reality and reassured.  Encouraged participation in milieu and group therapy on the unit.  Crisis/safety plan discussed with patient.  Discharge plan discussed with patient.       Levar Lira MD  Attending Psychiatrist   [facility]       This note was completed in part utilizing Dragon dictation Software. Grammatical, translation, syntax  errors, random word insertions, spelling mistakes, and incomplete sentences may be an occasional consequence of this system secondary to software limitations with voice recognition, ambient noise, and hardware issues. If you have any questions or concerns about the content, text, or information contained within the body of this dictation, please contact the provider for clarification.

## 2024-05-16 VITALS
HEART RATE: 95 BPM | TEMPERATURE: 98.6 F | BODY MASS INDEX: 21.17 KG/M2 | SYSTOLIC BLOOD PRESSURE: 116 MMHG | WEIGHT: 134.9 LBS | HEIGHT: 67 IN | RESPIRATION RATE: 16 BRPM | DIASTOLIC BLOOD PRESSURE: 67 MMHG | OXYGEN SATURATION: 100 %

## 2024-05-16 PROCEDURE — 99238 HOSP IP/OBS DSCHRG MGMT 30/<: CPT | Performed by: STUDENT IN AN ORGANIZED HEALTH CARE EDUCATION/TRAINING PROGRAM

## 2024-05-16 RX ADMIN — NICOTINE POLACRILEX 2 MG: 2 GUM, CHEWING ORAL at 10:22

## 2024-05-16 RX ADMIN — CYANOCOBALAMIN TAB 1000 MCG 1000 MCG: 1000 TAB at 08:10

## 2024-05-16 RX ADMIN — THIAMINE HCL TAB 100 MG 100 MG: 100 TAB at 08:10

## 2024-05-16 RX ADMIN — ACETAMINOPHEN 650 MG: 325 TABLET ORAL at 10:22

## 2024-05-16 RX ADMIN — FOLIC ACID 1 MG: 1 TABLET ORAL at 08:10

## 2024-05-16 RX ADMIN — ARIPIPRAZOLE 7 MG: 5 TABLET ORAL at 08:09

## 2024-05-16 RX ADMIN — GABAPENTIN 300 MG: 300 CAPSULE ORAL at 08:10

## 2024-05-16 RX ADMIN — NICOTINE 14 MG: 14 PATCH, EXTENDED RELEASE TRANSDERMAL at 08:10

## 2024-05-16 RX ADMIN — ESCITALOPRAM OXALATE 20 MG: 10 TABLET ORAL at 08:09

## 2024-05-16 RX ADMIN — NALTREXONE HYDROCHLORIDE 50 MG: 50 TABLET, FILM COATED ORAL at 08:10

## 2024-05-16 RX ADMIN — MULTIPLE VITAMINS W/ MINERALS TAB 1 TABLET: TAB ORAL at 08:09

## 2024-05-16 NOTE — PROGRESS NOTES
05/16/24 1127   Discharge Planning   Living Arrangements Lives w/ Family members   Support Systems Self;Family members;Psychiatrist;Organized support group (Comment);Therapist   Type of Current Residence Private residence   Current Home Care Services No   Other Referral/Resources/Interventions Provided:   Financial Resources Provided Indigent Medication   Referrals Provided: Crisis Hotline;IOP;Therapist;Psychiatrist;Support Group   Discharge Communications   Discharge planning discussed with: Patient and her sister Irais   Freedom of Choice Yes   IMM Given (Date):   (n/a)   Transportation at Discharge? No   Transport at Discharge  Automobile;Other (Comment)  (Family Transport)   ETA of Transport 1100   Transfer Mode Ambulate   Contacts   Patient Contacts Irais Moncada   Relationship to Patient: Family   Contact Method Phone   Phone Number [phone]   Reason/Outcome Continuity of Care;Emergency Contact;Discharge Planning   [facility]   Would you like to participate in our [facility] service program?   No - Declined
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
## 2024-05-16 NOTE — CASE MANAGEMENT
Case Management Discharge Planning Note    Patient name Christy Borjas  Location /-02 MRN [id number]  : 1960 Date 2024       Current Admission Date: 2024  Current Admission Diagnosis:Severe episode of recurrent major depressive disorder, without psychotic features (HCC)   Patient Active Problem List    Diagnosis Date Noted Date Diagnosed    Severe episode of recurrent major depressive disorder, without psychotic features (HCC) 2024     Alcohol use disorder 2024     Tobacco use 2024     Dyslipidemia 2024     Hypertension 2024     Emotional crisis 2024       LOS (days): 14  Geometric Mean LOS (GMLOS) (days):   Days to GMLOS:     OBJECTIVE:  Risk of Unplanned Readmission Score: 14.39         Current admission status: Inpatient Psych   Preferred Pharmacy:   [id number] - [place], [place] - [address] [address]  Phone: [phone] Fax: [phone]     [place] [address]  Phone: [phone] Fax: [phone]    Primary Care Provider: No primary care provider on file.    Primary Insurance:   Secondary Insurance:     DISCHARGE DETAILS:    Discharge planning discussed with:: Patient and her sister Irais  Freedom of Choice: Yes                   Contacts  Patient Contacts: Irais Moncada  Relationship to Patient:: Family  Contact Method: Phone  Phone Number: [phone]  Reason/Outcome: Continuity of Care, Emergency Contact, Discharge Planning              Other Referral/Resources/Interventions Provided:  Financial Resources Provided: Indigent Medication  Referrals Provided:: Crisis Hotline, IOP, Therapist, Psychiatrist, Support Group    Would you like to participate in our [facility] service program?  : No - Declined          Transport at Discharge : Automobile, Other (Comment) (Family Transport)                     Transfer Mode: Ambulate        IMM Given (Date)::  (n/a)             Patient was discharged to her sisters home in [place] where she was residing prior to her admission. She has no insurance at this time and no [place] state ID. In order for her to be seen and covered by a state jorge she has to prove residency and have a state issues ID. The sister is taking her today to get her [place] state ID and then she will get an appointment with the [facility] in [place], [place]. They are going to treat the patient for mental health as well as substance abuse treatment under a jorge and then refer her within their organization to the [employer] Program to help her with obtaining Medicaid. She will also be referred to their Supported Employment Program to assist her with job placement.     Yesterday this writer spoke to the substance use intern, she is going to call this writer on Friday to see about getting this patient scheduled for an appointment with the [facility]. Once a call is received this writer will call the patient to let her know when the appointment is.    Also, sat with the patient and looked up [facility] meetings that are located near her to assist in her recovery from alcohol. The patient was receptive to this over AA. They also have virtual in case transportation becomes an issue.     Patient and sister were informed of the steps that need to take place in order to get an appointment with the [facility] and how to contact them as well as applying for the Medicaid through them. Christy was anxious about going home to face things, but is ready for the next steps.

## 2024-05-16 NOTE — NURSING NOTE
Pt pleasant and med-compliant with good appetite and steady gait.  /67, Norvasc held at 0900.  Denied SI.  Pt expressed understanding of d/c meds and f/u instructions.  Pt left unit with all her belongings and escorted by staff to meet sister for transport home at 1110.  Monitored for safety and support.

## 2024-05-16 NOTE — PLAN OF CARE
Problem: Ineffective Coping  Goal: Identifies ineffective coping skills  Outcome: Progressing  Goal: Identifies healthy coping skills  Outcome: Progressing  Goal: Demonstrates healthy coping skills  Outcome: Progressing  Goal: Participates in unit activities  Description: Interventions:  - Provide therapeutic environment   - Provide required programming   - Redirect inappropriate behaviors   Outcome: Progressing  Goal: Understands least restrictive measures  Description: Interventions:  - Utilize least restrictive behavior  Outcome: Progressing

## 2024-05-16 NOTE — PROGRESS NOTES
05/16/24 0807   Team Meeting   Meeting Type Daily Rounds   Initial Conference Date 05/16/24   Next Conference Date 05/17/24   Team Members Present   Team Members Present Physician;Nurse;;   Physician Team Member Dr Lira, Dr Solomon, Dr Mariee, TOREY Noland   Nursing Team Member Juany   Care Management Team Member Madai   Social Work Team Member Alison   Patient/Family Present   Patient Present No   Patient's Family Present No     Discharge today at 11 am.

## 2024-05-17 NOTE — CASE MANAGEMENT
Nichole with the [facility] in [place] called this writer today and advised that they are not taking on new clients at this time, but they do have a 12 week program for trauma and substance history, it is called [facility] ([facility]). She said if the patient wants to focus on MH mostly that she would need to call the access center to get put on the schedule with them.     Called Christy at home and advised her of above. She said that she would call the Access Center to get an appointment with MH first. Advised that this writer is available if she has any questions to give a call.

## 2024-05-22 NOTE — CASE MANAGEMENT
Christy called and requested that her clinicals be sent to [facility] via fax to [phone] ATTN: Intake. Sent the fax this morning 5/22/24.

## 2024-07-23 ENCOUNTER — OFFICE VISIT (OUTPATIENT)
Dept: FAMILY MEDICINE CLINIC | Facility: CLINIC | Age: 64
End: 2024-07-23

## 2024-07-23 VITALS
SYSTOLIC BLOOD PRESSURE: 100 MMHG | WEIGHT: 133.6 LBS | TEMPERATURE: 97.4 F | DIASTOLIC BLOOD PRESSURE: 72 MMHG | RESPIRATION RATE: 18 BRPM | BODY MASS INDEX: 20.97 KG/M2 | HEART RATE: 72 BPM | HEIGHT: 67 IN

## 2024-07-23 DIAGNOSIS — E78.5 DYSLIPIDEMIA: ICD-10-CM

## 2024-07-23 DIAGNOSIS — Z13.6 SCREENING FOR CARDIOVASCULAR CONDITION: ICD-10-CM

## 2024-07-23 DIAGNOSIS — E55.9 VITAMIN D DEFICIENCY: Primary | ICD-10-CM

## 2024-07-23 DIAGNOSIS — F10.90 ALCOHOL USE DISORDER: ICD-10-CM

## 2024-07-23 DIAGNOSIS — Z91.09 ENVIRONMENTAL ALLERGIES: ICD-10-CM

## 2024-07-23 DIAGNOSIS — F33.2 SEVERE EPISODE OF RECURRENT MAJOR DEPRESSIVE DISORDER, WITHOUT PSYCHOTIC FEATURES (HCC): ICD-10-CM

## 2024-07-23 PROBLEM — I10 HYPERTENSION: Status: RESOLVED | Noted: 2024-05-03 | Resolved: 2024-07-23

## 2024-07-23 PROBLEM — E53.8 B12 DEFICIENCY: Status: ACTIVE | Noted: 2024-07-23

## 2024-07-23 PROCEDURE — 99203 OFFICE O/P NEW LOW 30 MIN: CPT | Performed by: NURSE PRACTITIONER

## 2024-07-23 RX ORDER — ERGOCALCIFEROL 1.25 MG/1
50000 CAPSULE ORAL WEEKLY
Qty: 8 CAPSULE | Refills: 0 | Status: SHIPPED | OUTPATIENT
Start: 2024-07-23

## 2024-07-23 RX ORDER — ATORVASTATIN CALCIUM 10 MG/1
10 TABLET, FILM COATED ORAL
Qty: 30 TABLET | Refills: 3 | Status: SHIPPED | OUTPATIENT
Start: 2024-07-23 | End: 2024-09-21

## 2024-07-23 RX ORDER — ARIPIPRAZOLE 2 MG/1
TABLET ORAL
Qty: 30 TABLET | Refills: 3 | Status: SHIPPED | OUTPATIENT
Start: 2024-07-23

## 2024-07-23 RX ORDER — ESCITALOPRAM OXALATE 20 MG/1
20 TABLET ORAL DAILY
Qty: 30 TABLET | Refills: 1 | Status: SHIPPED | OUTPATIENT
Start: 2024-07-23 | End: 2024-09-21

## 2024-07-23 RX ORDER — ARIPIPRAZOLE 5 MG/1
TABLET ORAL
Qty: 30 TABLET | Refills: 3 | Status: SHIPPED | OUTPATIENT
Start: 2024-07-23

## 2024-07-23 RX ORDER — TRAZODONE HYDROCHLORIDE 50 MG/1
50 TABLET ORAL
Qty: 30 TABLET | Refills: 3 | Status: SHIPPED | OUTPATIENT
Start: 2024-07-23 | End: 2024-09-21

## 2024-07-23 NOTE — PROGRESS NOTES
Ambulatory Visit  Name: Christy Borjas      : 1960      MRN: [id number]  Encounter Provider: SHILOH Aguayo  Encounter Date: 2024   Encounter department: [facility]    Assessment & Plan   1. Vitamin D deficiency  Assessment & Plan:  Will continue with supplementation  Orders:  -     ergocalciferol (VITAMIN D2) 50,000 units; Take 1 capsule (50,000 Units total) by mouth once a week  2. Alcohol use disorder  3. Dyslipidemia  Assessment & Plan:  Tolerating statin, order given for repeat labs  Orders:  -     atorvastatin (LIPITOR) 10 mg tablet; Take 1 tablet (10 mg total) by mouth daily with dinner  -     Comprehensive metabolic panel; Future  -     Lipid panel; Future; Expected date: 2024  4. Severe episode of recurrent major depressive disorder, without psychotic features (HCC)  Assessment & Plan:  Will have her continue on the Lexapro and Abilify.  She does patient establish with a psychiatrist once she obtains health insurance  Orders:  -     ARIPiprazole (ABILIFY) 2 mg tablet; Take 5 mg and 2 mg (total: 7 mg) daily  -     ARIPiprazole (ABILIFY) 5 mg tablet; Take 5 mg and 2 mg (total: 7 mg) daily  -     escitalopram (LEXAPRO) 20 mg tablet; Take 1 tablet (20 mg total) by mouth daily  -     traZODone (DESYREL) 50 mg tablet; Take 1 tablet (50 mg total) by mouth daily at bedtime  5. Screening for cardiovascular condition  6. Environmental allergies  Assessment & Plan:  Discussed switching to a different antihistamine also starting Flonase daily.     History of Present Illness     New patient here today to establish care.  Recently moved to the area from [place].  She has a longstanding history of depression, has been hospitalized for this in the past.  Reports she was admitted with suicidal ideation, with plan to drink excessive EtOH. S.  He does not typically drink except for when her depression is bad she is having suicidal ideation no other significant past medical history.   "She was discharged on amlodipine and a statin, but has not been taking the Amlodipine and has never had an issue with her blood pressure before.  She has chronic allergies, takes Claritin daily.  She does develop frequent sinus infections and would like to be checked.        Review of Systems   Constitutional: Negative.    HENT:  Positive for congestion and sinus pressure.    Respiratory: Negative.     Cardiovascular: Negative.    Gastrointestinal: Negative.    Psychiatric/Behavioral:          See HPI       Objective     /72   Pulse 72   Temp (!) 97.4 °F (36.3 °C)   Resp 18   Ht 5' 7\" (1.702 m)   Wt 60.6 kg (133 lb 9.6 oz)   BMI 20.92 kg/m²     Physical Exam  Vitals and nursing note reviewed.   Constitutional:       General: She is not in acute distress.     Appearance: Normal appearance.   HENT:      Head: Normocephalic and atraumatic.      Right Ear: Tympanic membrane normal.      Left Ear: Tympanic membrane normal.      Nose: Congestion present.      Mouth/Throat:      Pharynx: Oropharynx is clear.   Eyes:      Conjunctiva/sclera: Conjunctivae normal.   Neck:      Vascular: No carotid bruit.   Cardiovascular:      Rate and Rhythm: Normal rate and regular rhythm.      Pulses: Normal pulses.      Heart sounds: Normal heart sounds. No murmur heard.  Pulmonary:      Effort: Pulmonary effort is normal.      Breath sounds: Normal breath sounds.   Lymphadenopathy:      Cervical: No cervical adenopathy.   Skin:     General: Skin is warm and dry.   Neurological:      Mental Status: She is alert.   Psychiatric:         Mood and Affect: Mood normal.         Behavior: Behavior normal.       Administrative Statements           "

## 2024-07-23 NOTE — ASSESSMENT & PLAN NOTE
Will have her continue on the Lexapro and Abilify.  She does patient establish with a psychiatrist once she obtains health insurance

## 2024-07-31 ENCOUNTER — APPOINTMENT (OUTPATIENT)
Dept: LAB | Facility: HOSPITAL | Age: 64
End: 2024-07-31
Payer: COMMERCIAL

## 2024-07-31 ENCOUNTER — TELEPHONE (OUTPATIENT)
Dept: FAMILY MEDICINE CLINIC | Facility: CLINIC | Age: 64
End: 2024-07-31

## 2024-07-31 DIAGNOSIS — E78.5 DYSLIPIDEMIA: ICD-10-CM

## 2024-07-31 LAB
ALBUMIN SERPL BCG-MCNC: 4.4 G/DL (ref 3.5–5)
ALP SERPL-CCNC: 50 U/L (ref 34–104)
ALT SERPL W P-5'-P-CCNC: 13 U/L (ref 7–52)
ANION GAP SERPL CALCULATED.3IONS-SCNC: 9 MMOL/L (ref 4–13)
AST SERPL W P-5'-P-CCNC: 16 U/L (ref 13–39)
BILIRUB SERPL-MCNC: 0.69 MG/DL (ref 0.2–1)
BUN SERPL-MCNC: 9 MG/DL (ref 5–25)
CALCIUM SERPL-MCNC: 9.8 MG/DL (ref 8.4–10.2)
CHLORIDE SERPL-SCNC: 102 MMOL/L (ref 96–108)
CO2 SERPL-SCNC: 29 MMOL/L (ref 21–32)
CREAT SERPL-MCNC: 0.72 MG/DL (ref 0.6–1.3)
GFR SERPL CREATININE-BSD FRML MDRD: 90 ML/MIN/1.73SQ M
GLUCOSE P FAST SERPL-MCNC: 89 MG/DL (ref 65–99)
POTASSIUM SERPL-SCNC: 4 MMOL/L (ref 3.5–5.3)
PROT SERPL-MCNC: 6.9 G/DL (ref 6.4–8.4)
SODIUM SERPL-SCNC: 140 MMOL/L (ref 135–147)

## 2024-07-31 PROCEDURE — 80053 COMPREHEN METABOLIC PANEL: CPT

## 2024-07-31 PROCEDURE — 36415 COLL VENOUS BLD VENIPUNCTURE: CPT

## 2024-07-31 NOTE — TELEPHONE ENCOUNTER
Patient had a metabolic panel drawn at [facility] at the [facility], however they did not run the lipid panel that was already in the patient's chart.  Can someone please call and see if they can run this?

## 2024-09-18 DIAGNOSIS — F33.2 SEVERE EPISODE OF RECURRENT MAJOR DEPRESSIVE DISORDER, WITHOUT PSYCHOTIC FEATURES (HCC): ICD-10-CM

## 2024-09-18 RX ORDER — ESCITALOPRAM OXALATE 20 MG/1
TABLET ORAL
Qty: 30 TABLET | Refills: 5 | Status: SHIPPED | OUTPATIENT
Start: 2024-09-18

## 2024-09-20 NOTE — TELEPHONE ENCOUNTER
Patient called requesting refill for Escitalopram 20 mg. Patient made aware medication was refilled on 9/18/24 for 30 with 5 refills to [facility]. Patient instructed to contact the pharmacy to obtain refills of medication. Patient verbalized understanding.

## 2024-10-10 ENCOUNTER — TELEPHONE (OUTPATIENT)
Age: 64
End: 2024-10-10

## 2024-10-10 NOTE — TELEPHONE ENCOUNTER
PT requesting a phone call from Juli. She said its in regards to disabillity. Please call PT at the earliest convenience.    ThankYou

## 2024-10-10 NOTE — TELEPHONE ENCOUNTER
Christy Borjas and/or patient requested a call back to discuss disability.    They can be reached at P# [phone].       Thank you.

## 2024-10-16 NOTE — TELEPHONE ENCOUNTER
Pt is scheduled on 10/23, I am not sure what disability is in regards to, but I generally do not do disability paperwork. Can discuss further at visit. SHILOH Aguayo

## 2024-10-23 ENCOUNTER — OFFICE VISIT (OUTPATIENT)
Dept: FAMILY MEDICINE CLINIC | Facility: CLINIC | Age: 64
End: 2024-10-23

## 2024-10-23 VITALS
WEIGHT: 130.2 LBS | BODY MASS INDEX: 20.44 KG/M2 | SYSTOLIC BLOOD PRESSURE: 112 MMHG | HEIGHT: 67 IN | DIASTOLIC BLOOD PRESSURE: 72 MMHG | TEMPERATURE: 97.3 F | HEART RATE: 72 BPM | RESPIRATION RATE: 16 BRPM

## 2024-10-23 DIAGNOSIS — F33.2 SEVERE EPISODE OF RECURRENT MAJOR DEPRESSIVE DISORDER, WITHOUT PSYCHOTIC FEATURES (HCC): ICD-10-CM

## 2024-10-23 DIAGNOSIS — E78.5 DYSLIPIDEMIA: Primary | ICD-10-CM

## 2024-10-23 DIAGNOSIS — E55.9 VITAMIN D DEFICIENCY: ICD-10-CM

## 2024-10-23 PROCEDURE — 99213 OFFICE O/P EST LOW 20 MIN: CPT | Performed by: NURSE PRACTITIONER

## 2024-10-23 NOTE — PROGRESS NOTES
Ambulatory Visit  Name: Christy Borjas      : 1960      MRN: [id number]  Encounter Provider: SHILOH Aguayo  Encounter Date: 10/23/2024   Encounter department: [facility]    Assessment & Plan  Dyslipidemia  Order given for repeat labs, ok to wait until insurance is active.  Orders:    Lipid Panel with Direct LDL reflex; Future    Basic metabolic panel; Future    Vitamin D deficiency  Advised to continue 5000 IU daily  Orders:    Vitamin D 25 hydroxy; Future    Severe episode of recurrent major depressive disorder, without psychotic features (HCC)  Mood stable, she will be establishing with a psychiatrist once her insurance is active             History of Present Illness     Here today for follow up.  He is doing well overall.    Feeling less fatigued since stopping the Gabapentin  BP controlled off medications   Applied for Medicaid should be active soon.             History obtained from : patient  Review of Systems   Constitutional: Negative.    Respiratory: Negative.     Cardiovascular: Negative.    Gastrointestinal: Negative.    Neurological: Negative.      Current Outpatient Medications on File Prior to Visit   Medication Sig Dispense Refill    ARIPiprazole (ABILIFY) 2 mg tablet Take 5 mg and 2 mg (total: 7 mg) daily 30 tablet 3    ARIPiprazole (ABILIFY) 5 mg tablet Take 5 mg and 2 mg (total: 7 mg) daily 30 tablet 3    atorvastatin (LIPITOR) 10 mg tablet Take 1 tablet (10 mg total) by mouth daily with dinner 30 tablet 3    Cholecalciferol (Vitamin D3) 125 MCG (5000 UT) TABS Take 5,000 Units by mouth daily      cyanocobalamin (VITAMIN B-12) 1000 MCG tablet Take 1 tablet (1,000 mcg total) by mouth daily 30 tablet 1    escitalopram (LEXAPRO) 20 mg tablet TAKE ONE TABLET BY MOUTH EVERY DAY (GENERIC FOR LEXAPRO) 30 tablet 5    folic acid (FOLVITE) 1 mg tablet Take 1 tablet (1 mg total) by mouth daily 30 tablet 1    traZODone (DESYREL) 50 mg tablet Take 1 tablet (50 mg total) by mouth  "daily at bedtime 30 tablet 3    [DISCONTINUED] ergocalciferol (VITAMIN D2) 50,000 units Take 1 capsule (50,000 Units total) by mouth once a week 8 capsule 0     No current facility-administered medications on file prior to visit.      Social History     Tobacco Use    Smoking status: Never    Smokeless tobacco: Never   Vaping Use    Vaping status: Every Day    Start date: 1/1/2017    Substances: Nicotine, Flavoring   Substance and Sexual Activity    Alcohol use: Not Currently    Drug use: Never    Sexual activity: Not on file         Objective     /72   Pulse 72   Temp (!) 97.3 °F (36.3 °C)   Resp 16   Ht 5' 7\" (1.702 m)   Wt 59.1 kg (130 lb 3.2 oz)   BMI 20.39 kg/m²     Physical Exam  Vitals and nursing note reviewed.   Constitutional:       General: She is not in acute distress.     Appearance: Normal appearance.   HENT:      Head: Normocephalic and atraumatic.   Eyes:      Conjunctiva/sclera: Conjunctivae normal.   Neck:      Vascular: No carotid bruit.   Cardiovascular:      Rate and Rhythm: Normal rate and regular rhythm.      Pulses: Normal pulses.      Heart sounds: Normal heart sounds. No murmur heard.  Pulmonary:      Effort: Pulmonary effort is normal.      Breath sounds: Normal breath sounds.   Skin:     General: Skin is warm and dry.   Neurological:      Mental Status: She is alert.   Psychiatric:         Mood and Affect: Mood normal.         Behavior: Behavior normal.         "

## 2024-10-23 NOTE — ASSESSMENT & PLAN NOTE
Order given for repeat labs, ok to wait until insurance is active.  Orders:    Lipid Panel with Direct LDL reflex; Future    Basic metabolic panel; Future

## 2024-11-21 ENCOUNTER — APPOINTMENT (OUTPATIENT)
Dept: LAB | Facility: HOSPITAL | Age: 64
End: 2024-11-21
Payer: COMMERCIAL

## 2024-11-21 ENCOUNTER — RESULTS FOLLOW-UP (OUTPATIENT)
Dept: FAMILY MEDICINE CLINIC | Facility: CLINIC | Age: 64
End: 2024-11-21

## 2024-11-21 DIAGNOSIS — E78.5 DYSLIPIDEMIA: ICD-10-CM

## 2024-11-21 DIAGNOSIS — E55.9 VITAMIN D DEFICIENCY: ICD-10-CM

## 2024-11-21 DIAGNOSIS — F33.2 SEVERE EPISODE OF RECURRENT MAJOR DEPRESSIVE DISORDER, WITHOUT PSYCHOTIC FEATURES (HCC): ICD-10-CM

## 2024-11-21 LAB
25(OH)D3 SERPL-MCNC: 32.5 NG/ML (ref 30–100)
ANION GAP SERPL CALCULATED.3IONS-SCNC: 5 MMOL/L (ref 4–13)
BUN SERPL-MCNC: 14 MG/DL (ref 5–25)
CALCIUM SERPL-MCNC: 9.6 MG/DL (ref 8.4–10.2)
CHLORIDE SERPL-SCNC: 101 MMOL/L (ref 96–108)
CHOLEST SERPL-MCNC: 145 MG/DL (ref ?–200)
CO2 SERPL-SCNC: 32 MMOL/L (ref 21–32)
CREAT SERPL-MCNC: 0.71 MG/DL (ref 0.6–1.3)
GFR SERPL CREATININE-BSD FRML MDRD: 91 ML/MIN/1.73SQ M
GLUCOSE P FAST SERPL-MCNC: 91 MG/DL (ref 65–99)
HDLC SERPL-MCNC: 54 MG/DL
LDLC SERPL CALC-MCNC: 66 MG/DL (ref 0–100)
POTASSIUM SERPL-SCNC: 4.5 MMOL/L (ref 3.5–5.3)
SODIUM SERPL-SCNC: 138 MMOL/L (ref 135–147)
TRIGL SERPL-MCNC: 126 MG/DL (ref ?–150)

## 2024-11-21 PROCEDURE — 36415 COLL VENOUS BLD VENIPUNCTURE: CPT

## 2024-11-21 PROCEDURE — 82306 VITAMIN D 25 HYDROXY: CPT

## 2024-11-21 PROCEDURE — 80048 BASIC METABOLIC PNL TOTAL CA: CPT

## 2024-11-21 PROCEDURE — 80061 LIPID PANEL: CPT

## 2024-11-21 RX ORDER — ATORVASTATIN CALCIUM 10 MG/1
TABLET, FILM COATED ORAL
Qty: 30 TABLET | Refills: 5 | Status: SHIPPED | OUTPATIENT
Start: 2024-11-21

## 2024-11-21 RX ORDER — ARIPIPRAZOLE 5 MG/1
TABLET ORAL
Qty: 30 TABLET | Refills: 3 | Status: SHIPPED | OUTPATIENT
Start: 2024-11-21

## 2024-11-21 RX ORDER — ARIPIPRAZOLE 2 MG/1
TABLET ORAL
Qty: 30 TABLET | Refills: 3 | Status: SHIPPED | OUTPATIENT
Start: 2024-11-21

## 2025-01-14 ENCOUNTER — TELEPHONE (OUTPATIENT)
Dept: FAMILY MEDICINE CLINIC | Facility: CLINIC | Age: 65
End: 2025-01-14

## 2025-01-14 NOTE — TELEPHONE ENCOUNTER
Called patient and scheduled her physical for 10am 2/26/25. She wanted to ask Juli if there is any medication or supplement she can take for restless leg syndrome. She takes magnesium daily already, but she stated she is having a hard time sleeping at night due to restless leg syndrome. She wanted to ask prior to her appt since it is over 1 month away. Please advise

## 2025-01-14 NOTE — TELEPHONE ENCOUNTER
Patient is scheduled on February 26 for a follow-up.  Please change to a 30-minute CPE/follow-up.  Thanks!

## 2025-01-15 DIAGNOSIS — Z00.6 ENCOUNTER FOR EXAMINATION FOR NORMAL COMPARISON OR CONTROL IN CLINICAL RESEARCH PROGRAM: ICD-10-CM

## 2025-01-16 NOTE — TELEPHONE ENCOUNTER
Would recommend she try a CoQ10 supplement over the counter in addition to the magnesium supplement at bedtime.  Also needs to make sure she is getting enough fluids.  If this isn't helpful, can discuss at her follow up.

## 2025-02-26 ENCOUNTER — OFFICE VISIT (OUTPATIENT)
Dept: FAMILY MEDICINE CLINIC | Facility: CLINIC | Age: 65
End: 2025-02-26
Payer: COMMERCIAL

## 2025-02-26 VITALS
DIASTOLIC BLOOD PRESSURE: 74 MMHG | WEIGHT: 129 LBS | TEMPERATURE: 97.2 F | BODY MASS INDEX: 20.73 KG/M2 | SYSTOLIC BLOOD PRESSURE: 126 MMHG | RESPIRATION RATE: 18 BRPM | HEART RATE: 69 BPM | HEIGHT: 66 IN | OXYGEN SATURATION: 98 %

## 2025-02-26 DIAGNOSIS — E78.5 DYSLIPIDEMIA: ICD-10-CM

## 2025-02-26 DIAGNOSIS — Z00.00 ROUTINE ADULT HEALTH MAINTENANCE: Primary | ICD-10-CM

## 2025-02-26 DIAGNOSIS — Z23 ENCOUNTER FOR IMMUNIZATION: ICD-10-CM

## 2025-02-26 DIAGNOSIS — Z12.31 SCREENING MAMMOGRAM FOR BREAST CANCER: ICD-10-CM

## 2025-02-26 DIAGNOSIS — Z12.11 SCREENING FOR COLON CANCER: ICD-10-CM

## 2025-02-26 DIAGNOSIS — F33.2 SEVERE EPISODE OF RECURRENT MAJOR DEPRESSIVE DISORDER, WITHOUT PSYCHOTIC FEATURES (HCC): ICD-10-CM

## 2025-02-26 PROCEDURE — 90472 IMMUNIZATION ADMIN EACH ADD: CPT

## 2025-02-26 PROCEDURE — 90750 HZV VACC RECOMBINANT IM: CPT

## 2025-02-26 PROCEDURE — 99396 PREV VISIT EST AGE 40-64: CPT | Performed by: NURSE PRACTITIONER

## 2025-02-26 PROCEDURE — 90715 TDAP VACCINE 7 YRS/> IM: CPT

## 2025-02-26 PROCEDURE — 90471 IMMUNIZATION ADMIN: CPT

## 2025-02-26 RX ORDER — TRAZODONE HYDROCHLORIDE 50 MG/1
50 TABLET ORAL
Qty: 30 TABLET | Refills: 3 | Status: SHIPPED | OUTPATIENT
Start: 2025-02-26 | End: 2025-04-27

## 2025-02-26 NOTE — PROGRESS NOTES
Adult Annual Physical  Name: Christy Borjas      : 1960      MRN: [id number]  Encounter Provider: SHILOH Aguayo  Encounter Date: 2025   Encounter department: [facility]    Assessment & Plan  Routine adult health maintenance         Encounter for immunization    Orders:  •  TDAP VACCINE GREATER THAN OR EQUAL TO 8YO IM  •  Zoster Vaccine Recombinant IM    Screening for colon cancer    Orders:  •  Cologuard    Screening mammogram for breast cancer    Orders:  •  Mammo screening bilateral w 3d and cad; Future    Severe episode of recurrent major depressive disorder, without psychotic features (HCC)  Patient reports significant improvement with Abilify  Orders:  •  Ambulatory Referral to Psych Services; Future  •  traZODone (DESYREL) 50 mg tablet; Take 1 tablet (50 mg total) by mouth daily at bedtime    Dyslipidemia    Orders:  •  Comprehensive metabolic panel; Future  •  Lipid Panel with Direct LDL reflex; Future    Immunizations and preventive care screenings were discussed with patient today. Appropriate education was printed on patient's after visit summary.    Counseling:  Dental Health: discussed importance of regular tooth brushing, flossing, and dental visits.  Exercise: the importance of regular exercise/physical activity was discussed. Recommend exercise 3-5 times per week for at least 30 minutes.   Declines Pap        Depression Screening and Follow-up Plan: Patient was screened for depression during today's encounter. They screened negative with a PHQ-9 score of 4.      Tobacco Cessation Counseling: Tobacco cessation counseling was provided.         History of Present Illness     Adult Annual Physical:  Patient presents for annual physical. Here today for CPE, no concerns. .     Diet and Physical Activity:  - Diet/Nutrition: well balanced diet.  - Exercise: no formal exercise.    Depression Screening:    - PHQ-9 Score: 4    Review of Systems   Constitutional: Negative.   "  Respiratory: Negative.     Cardiovascular: Negative.    Gastrointestinal: Negative.    Genitourinary: Negative.    Neurological: Negative.    Psychiatric/Behavioral:          See HPI     Current Outpatient Medications on File Prior to Visit   Medication Sig Dispense Refill   • ARIPiprazole (ABILIFY) 2 mg tablet TAKE 1 TABLET BY MOUTH WITH 5MG TABLET DAILY (GENERIC FOR ABILIFY) 30 tablet 3   • ARIPiprazole (ABILIFY) 5 mg tablet TAKE 1 TABLET BY MOUTH WITH 2MG TABLET DAILY (GENERIC FOR ABILIFY) 30 tablet 3   • atorvastatin (LIPITOR) 10 mg tablet TAKE ONE TABLET BY MOUTH EVERY DAY WITH DINNER (GENERIC FOR LIPITOR) 30 tablet 5   • Cholecalciferol (Vitamin D3) 125 MCG (5000 UT) TABS Take 5,000 Units by mouth daily     • cyanocobalamin (VITAMIN B-12) 1000 MCG tablet Take 1 tablet (1,000 mcg total) by mouth daily 30 tablet 1   • escitalopram (LEXAPRO) 20 mg tablet TAKE ONE TABLET BY MOUTH EVERY DAY (GENERIC FOR LEXAPRO) 30 tablet 5   • [DISCONTINUED] folic acid (FOLVITE) 1 mg tablet Take 1 tablet (1 mg total) by mouth daily 30 tablet 1   • [DISCONTINUED] traZODone (DESYREL) 50 mg tablet Take 1 tablet (50 mg total) by mouth daily at bedtime 30 tablet 3     No current facility-administered medications on file prior to visit.      Social History     Tobacco Use   • Smoking status: Never     Passive exposure: Never   • Smokeless tobacco: Never   Vaping Use   • Vaping status: Every Day   • Start date: 1/1/2017   • Substances: Nicotine, Flavoring   Substance and Sexual Activity   • Alcohol use: Not Currently   • Drug use: Never   • Sexual activity: Not on file       Objective   /74   Pulse 69   Temp (!) 97.2 °F (36.2 °C)   Resp 18   Ht 5' 6\" (1.676 m)   Wt 58.5 kg (129 lb)   SpO2 98%   BMI 20.82 kg/m²     Physical Exam  Vitals and nursing note reviewed.   Constitutional:       Appearance: Normal appearance. She is well-developed.   HENT:      Head: Normocephalic and atraumatic.      Right Ear: Tympanic membrane " and external ear normal.      Left Ear: Tympanic membrane and external ear normal.      Nose: Nose normal.      Mouth/Throat:      Pharynx: Oropharynx is clear.   Eyes:      Extraocular Movements: Extraocular movements intact.      Conjunctiva/sclera: Conjunctivae normal.      Pupils: Pupils are equal, round, and reactive to light.   Neck:      Thyroid: No thyromegaly.      Vascular: No carotid bruit.   Cardiovascular:      Rate and Rhythm: Normal rate and regular rhythm.      Pulses: Normal pulses.      Heart sounds: Normal heart sounds. No murmur heard.  Pulmonary:      Effort: Pulmonary effort is normal.      Breath sounds: Normal breath sounds.   Abdominal:      General: Bowel sounds are normal. There is no distension.      Palpations: Abdomen is soft.      Tenderness: There is no abdominal tenderness.   Musculoskeletal:         General: No deformity. Normal range of motion.      Cervical back: Normal range of motion and neck supple.   Lymphadenopathy:      Cervical: No cervical adenopathy.   Skin:     General: Skin is warm and dry.      Capillary Refill: Capillary refill takes less than 2 seconds.   Neurological:      Mental Status: She is alert.      Sensory: No sensory deficit.      Motor: No abnormal muscle tone.      Coordination: Coordination normal.      Deep Tendon Reflexes: Reflexes normal.   Psychiatric:         Mood and Affect: Mood normal.         Behavior: Behavior normal.

## 2025-02-26 NOTE — ASSESSMENT & PLAN NOTE
Patient reports significant improvement with Abilify  Orders:  •  Ambulatory Referral to Psych Services; Future  •  traZODone (DESYREL) 50 mg tablet; Take 1 tablet (50 mg total) by mouth daily at bedtime

## 2025-03-05 ENCOUNTER — RESULTS FOLLOW-UP (OUTPATIENT)
Dept: FAMILY MEDICINE CLINIC | Facility: CLINIC | Age: 65
End: 2025-03-05

## 2025-03-05 LAB — COLOGUARD RESULT REPORTABLE: NEGATIVE

## 2025-03-13 ENCOUNTER — TELEPHONE (OUTPATIENT)
Age: 65
End: 2025-03-13

## 2025-03-13 NOTE — TELEPHONE ENCOUNTER
Contacted patient in regards to ROUTINE Referral, LVM to contact [phone] to discuss services needed at this time in order to be added to proper wait list.    Please add patient to proper WL(s).    Please verify patients insurance.

## 2025-04-01 DIAGNOSIS — F33.2 SEVERE EPISODE OF RECURRENT MAJOR DEPRESSIVE DISORDER, WITHOUT PSYCHOTIC FEATURES (HCC): ICD-10-CM

## 2025-04-02 RX ORDER — ARIPIPRAZOLE 2 MG/1
TABLET ORAL
Qty: 30 TABLET | Refills: 3 | Status: SHIPPED | OUTPATIENT
Start: 2025-04-02

## 2025-04-02 RX ORDER — ESCITALOPRAM OXALATE 20 MG/1
TABLET ORAL
Qty: 30 TABLET | Refills: 5 | Status: SHIPPED | OUTPATIENT
Start: 2025-04-02

## 2025-04-02 RX ORDER — ARIPIPRAZOLE 5 MG/1
TABLET ORAL
Qty: 30 TABLET | Refills: 3 | Status: SHIPPED | OUTPATIENT
Start: 2025-04-02

## 2025-05-14 ENCOUNTER — HOSPITAL ENCOUNTER (OUTPATIENT)
Dept: RADIOLOGY | Facility: HOSPITAL | Age: 65
Discharge: HOME | End: 2025-05-14
Payer: COMMERCIAL

## 2025-05-14 VITALS — HEIGHT: 66 IN | BODY MASS INDEX: 21.38 KG/M2 | WEIGHT: 133 LBS

## 2025-05-14 DIAGNOSIS — Z12.31 SCREENING MAMMOGRAM FOR BREAST CANCER: ICD-10-CM

## 2025-05-14 PROCEDURE — 77063 BREAST TOMOSYNTHESIS BI: CPT

## 2025-05-14 PROCEDURE — 77067 SCR MAMMO BI INCL CAD: CPT

## 2025-06-01 DIAGNOSIS — E78.5 DYSLIPIDEMIA: ICD-10-CM

## 2025-06-01 RX ORDER — ATORVASTATIN CALCIUM 10 MG/1
10 TABLET, FILM COATED ORAL
Qty: 30 TABLET | Refills: 5 | Status: SHIPPED | OUTPATIENT
Start: 2025-06-01

## 2025-07-29 DIAGNOSIS — F33.2 SEVERE EPISODE OF RECURRENT MAJOR DEPRESSIVE DISORDER, WITHOUT PSYCHOTIC FEATURES (HCC): ICD-10-CM

## 2025-07-30 RX ORDER — ARIPIPRAZOLE 2 MG/1
TABLET ORAL
Qty: 30 TABLET | Refills: 3 | Status: SHIPPED | OUTPATIENT
Start: 2025-07-30

## 2025-07-30 RX ORDER — ARIPIPRAZOLE 5 MG/1
TABLET ORAL
Qty: 30 TABLET | Refills: 3 | Status: SHIPPED | OUTPATIENT
Start: 2025-07-30

## 2025-07-31 DIAGNOSIS — F33.2 SEVERE EPISODE OF RECURRENT MAJOR DEPRESSIVE DISORDER, WITHOUT PSYCHOTIC FEATURES (HCC): ICD-10-CM

## 2025-08-01 RX ORDER — TRAZODONE HYDROCHLORIDE 50 MG/1
TABLET ORAL
Qty: 30 TABLET | Refills: 5 | Status: SHIPPED | OUTPATIENT
Start: 2025-08-01